# Patient Record
Sex: FEMALE | Race: WHITE | HISPANIC OR LATINO | Employment: UNEMPLOYED | URBAN - METROPOLITAN AREA
[De-identification: names, ages, dates, MRNs, and addresses within clinical notes are randomized per-mention and may not be internally consistent; named-entity substitution may affect disease eponyms.]

---

## 2018-03-29 ENCOUNTER — ANESTHESIA EVENT (OUTPATIENT)
Dept: SURGERY | Facility: HOSPITAL | Age: 43
End: 2018-03-29
Payer: MEDICAID

## 2018-03-29 ENCOUNTER — CLINICAL SUPPORT (OUTPATIENT)
Dept: LAB | Facility: HOSPITAL | Age: 43
End: 2018-03-29
Attending: OBSTETRICS & GYNECOLOGY
Payer: MEDICAID

## 2018-03-29 ENCOUNTER — HOSPITAL ENCOUNTER (OUTPATIENT)
Dept: PREADMISSION TESTING | Facility: HOSPITAL | Age: 43
Discharge: HOME | End: 2018-03-29
Attending: OBSTETRICS & GYNECOLOGY
Payer: MEDICAID

## 2018-03-29 VITALS
WEIGHT: 201.19 LBS | TEMPERATURE: 98 F | HEIGHT: 65 IN | SYSTOLIC BLOOD PRESSURE: 146 MMHG | OXYGEN SATURATION: 98 % | HEART RATE: 79 BPM | BODY MASS INDEX: 33.52 KG/M2 | DIASTOLIC BLOOD PRESSURE: 77 MMHG | RESPIRATION RATE: 16 BRPM

## 2018-03-29 DIAGNOSIS — Z01.818 PRE-OP EXAM: Primary | ICD-10-CM

## 2018-03-29 DIAGNOSIS — Z01.818 PRE-OP EXAM: ICD-10-CM

## 2018-03-29 DIAGNOSIS — E11.9 CONTROLLED TYPE 2 DIABETES MELLITUS WITHOUT COMPLICATION, WITHOUT LONG-TERM CURRENT USE OF INSULIN: ICD-10-CM

## 2018-03-29 DIAGNOSIS — I10 HYPERTENSION, UNSPECIFIED TYPE: ICD-10-CM

## 2018-03-29 PROCEDURE — 93010 ELECTROCARDIOGRAM REPORT: CPT | Mod: ,,, | Performed by: INTERNAL MEDICINE

## 2018-03-29 PROCEDURE — 93005 ELECTROCARDIOGRAM TRACING: CPT

## 2018-03-29 PROCEDURE — 93010 EKG 12-LEAD: ICD-10-PCS | Mod: ,,, | Performed by: INTERNAL MEDICINE

## 2018-03-29 RX ORDER — SODIUM CHLORIDE, SODIUM LACTATE, POTASSIUM CHLORIDE, CALCIUM CHLORIDE 600; 310; 30; 20 MG/100ML; MG/100ML; MG/100ML; MG/100ML
INJECTION, SOLUTION INTRAVENOUS CONTINUOUS
Status: CANCELLED | OUTPATIENT
Start: 2018-03-29

## 2018-03-29 RX ORDER — METFORMIN HYDROCHLORIDE 1000 MG/1
1000 TABLET ORAL 2 TIMES DAILY WITH MEALS
COMMUNITY

## 2018-03-29 RX ORDER — LIDOCAINE HYDROCHLORIDE 10 MG/ML
1 INJECTION, SOLUTION EPIDURAL; INFILTRATION; INTRACAUDAL; PERINEURAL ONCE
Status: CANCELLED | OUTPATIENT
Start: 2018-03-29 | End: 2018-03-29

## 2018-03-29 NOTE — ANESTHESIA PREPROCEDURE EVALUATION
03/29/2018  Lois Varela is a 43 y.o., female is scheduled for laparoscopic oophorectomy under GET[facility] on 4/10/2018.\    Pt [place] speaking only;  present for entire visit.    Past Surgical History:   Procedure Laterality Date    HYSTERECTOMY      VAGINAL DELIVERY      x3        Anesthesia Evaluation    I have reviewed the Patient Summary Reports.    I have reviewed the Nursing Notes.   I have reviewed the Medications.     Review of Systems  Anesthesia Hx:  No problems with previous Anesthesia  History of prior surgery of interest to airway management or planning: Previous anesthesia: General  Denies Personal Hx of Anesthesia complications.   Social:  Non-Smoker, No Alcohol Use    Hematology/Oncology:  Hematology Normal        EENT/Dental:EENT/Dental Normal   Cardiovascular:   Exercise tolerance: good Hypertension (does not recall name of bp medication), well controlled Denies Dysrhythmias.   Denies Angina.        Pulmonary:   Denies Shortness of breath.    Renal/:  Renal/ Normal     Hepatic/GI:  Hepatic/GI Normal    OB/GYN/PEDS:  Pelvic pain   Neurological:  Neurology Normal    Endocrine:   Diabetes, well controlled, type 2        Physical Exam  General:  Obesity    Airway/Jaw/Neck:  Airway Findings: Mouth Opening: Normal Tongue: Normal  General Airway Assessment: Adult  Mallampati: II  TM Distance: Normal, at least 6 cm        Eyes/Ears/Nose:  EYES/EARS/NOSE FINDINGS: Normal   Dental:  Dental Findings: In tact, Periodontal disease, Mild   Chest/Lungs:  Chest/Lungs Clear    Heart/Vascular:  Heart Findings: Normal Heart murmur: negative    Abdomen:  Abdomen Findings: Normal      Mental Status:  Mental Status Findings: Normal        Anesthesia Plan  Type of Anesthesia, risks & benefits discussed:  Anesthesia Type:  general  Patient's Preference:   Intra-op Monitoring Plan: standard ASA  monitors  Intra-op Monitoring Plan Comments:   Post Op Pain Control Plan: multimodal analgesia  Post Op Pain Control Plan Comments:   Induction:   IV  Beta Blocker:  Patient is not currently on a Beta-Blocker (No further documentation required).       Informed Consent: Patient understands risks and agrees with Anesthesia plan.  Questions answered.   ASA Score: 2     Day of Surgery Review of History & Physical:  There are no significant changes.      Anesthesia Plan Notes: Anesthesia consent will be obtained prior to procedure on 4/10/2018.        Ready For Surgery From Anesthesia Perspective.

## 2018-03-29 NOTE — PRE-PROCEDURE INSTRUCTIONS
Aysha  [phone]    Information translated per pt's daughter Aysha  Allergies, medical, surgical, family and psychosocial histories reviewed with patient. Periop plan of care reviewed. Preop instructions given, including medications to take and to hold. Time allotted for questions to be addressed.  Patient verbalized understanding.

## 2018-03-29 NOTE — DISCHARGE INSTRUCTIONS
Your surgery is scheduled for 4/10/18.    Please report to Outpatient Surgery Intake Office on the 2nd FLOOR at 845 a.m.          INSTRUCTIONS IMPORTANT!!!  ¨ Do not eat or drink after 12 midnight-including water. OK to brush teeth, no   gum, candy or mints!    ¨ Take only these medicines with a small swallow of water-morning of surgery.    Blood pressure medicine.        ____  Proceed to [facility] for additional blood test.        ____  Do not wear makeup, including mascara.  ____  No powder, lotions or creams to surgical area.  ____  Please remove all jewelry, including piercings and leave at home.  ____  No money or valuables needed. Please leave at home.  ____  Please bring any documents given by your doctor.  ____  If going home the same day, arrange for a ride home. You will not be able to             drive if Anesthesia was used.  ____  Children under 18 years require a parent / guardian present the entire time             they are in surgery / recovery.  ____  Wear loose fitting clothing. Allow for dressings, bandages.  ____  Stop Aspirin, Ibuprofen, Motrin and Aleve at least 3-5 days before surgery, unless otherwise instructed by your doctor, or the nurse.   You MAY use Tylenol/acetaminophen until day of surgery.  ____  Wash the surgical area with Hibiclens the night before surgery, and again the             morning of surgery.  Be sure to rinse hibiclens off completely (if instructed by   nurse).  ____  If you take diabetic medication, do not take am of surgery unless instructed by Doctor.  ____  Call MD for temperature above 101 degrees.  ____ Stop taking any Fish Oil supplement or any Vitamins that contain Vitamin E at least 5 days prior to surgery.  ____ Do Not wear your contact lenses the day of your procedure.  You may wear your glasses.        I have read or had read and explained to me, and understand the above information.  Additional comments or instructions:  For additional  questions call [phone]      Pre-Op Bathing Instructions    Before surgery, you can play an important role in your own health.    Because skin is not sterile, we need to be sure that your skin is as free of germs as possible. By following the instructions below, you can reduce the number of germs on your skin before surgery.    IMPORTANT: You will need to shower with a special soap called Hibiclens*, available at any pharmacy.  If you are allergic to Chlorhexidine (the antiseptic in Hibiclens), use an antibacterial soap such as Dial Soap for your preoperative shower.  You will shower with Hibiclens both the night before your surgery and the morning of your surgery.  Do not use Hibiclens on the head, face or genitals to avoid injury to those areas.    STEP #1: THE NIGHT BEFORE YOUR SURGERY     1. Do not shave the area of your body where your surgery will be performed.  2. Shower and wash your hair and body as usual with your normal soap and shampoo.  3. Rinse your hair and body thoroughly after you shower to remove all soap residue.  4. With your hand, apply one packet of Hibiclens soap to the surgical site.   5. Wash the site gently for five (5) minutes. Do not scrub your skin too hard.   6. Do not wash with your regular soap after Hibiclens is used.  7. Rinse your body thoroughly.  8. Pat yourself dry with a clean, soft towel.  9. Do not use lotion, cream, or powder.  10. Wear clean clothes.    STEP #2: THE MORNING OF YOUR SURGERY     1. Repeat Step #1.    * Not to be used by people allergic to Chlorhexidine.          Instrucciones Pre-Operativas Para Banarse    Usted juega un papel muy importante en canchola melanie antes de canchola cirugia.  [place] canchola piel no esta esterilizada, necesitamos asegurarnos que se encuentre lo mas limpia y sin germenes que sea posible.  Al seguir estas instrucciones usted podra reducir el jie de germenes en la piel antes de canchola cirugia.    IMPORTANTE: Necesitara banarse con un jabon especial  llamado *Hibiclens, que lo encontrara en cualquier farmacia.  Si usted es alergico a Clorhexidine ( el anticeptico que se encuentra en Hibiclens), use un jabon antibacterial lacey Dial para banarse.  Debera banarse con Hibiclens la noche antes de la cirugia y la manana del maya de canchola cirugia. No use el Hibiclens en canchola katlyn, la samuel o raulito partes genitales para evitar lesiones en esas areas.    La noche antes de canchola cirugia:    1. No afeite la parte de canchola cuerpo donde se tanner la cirugia.  2. Banese y lave canchola maria victoria normalmente con canchola jabon y shampu.  3. Enjuage ginette canchola maria victoria y canchola cuerpo para remover residuos del jabon.  4. Con canchola mano, aplique un paquetito de Hibiclens al area donde se tanner la cirugia.  5. Lave el area suavemente por eric (5) minutos.  No estregue canchola piel muy kandice.  6. No se lave con jabon regular despues de usar el Hibiclens.  7. Enjuage canchola cuerpo muy ginette.  8. Sequese con flynn toalla limpia y suave.  9. No use locion, cremas o polvos/talcos.  10. Use ropa limpia.      La manana del maya de canchola cirujia:           1.Repita el proceso anterior        *No usar si usted es alergico a Chlorhexidine        Anestesia: Anestesia general     Estás vimos continuamente zoila el procedimiento por el proveedor de la anestesia.     Usted tiene programada flynn fecha para flynn operación quirúrgica. Zoila la operación, le administrarán un medicamento anestésico (llamado también anestesia) para que esté cómodo y sin dolor. Canchola cirujano le administrará anestesia general. En esta hoja se da más información sobre cheko tipo de anestesia.  ¿En qué consiste la anestesia general?  Con la anestesia general usted estará profundamente dormido. La anestesia general se administra en la kyra (anestesia intravenosa), en los pulmones (anestesia con gas), o de ambas formas. Usted no sentirá nada zoila la operación, y tampoco la recordará. El anestesista vigila canchola estado y monitoriza canchola frecuencia y ritmo cardíaco, canchola presión  arterial y canchola nivel de oxígeno en la kyra zoila todo el procedimiento.  · Anestesia intravenosa, La anestesia intravenosa se administra mediante flynn sonda intravenosa en el brazo, y suele darse navid para que el paciente ya esté dormido cuando le administren la anestesia con gas. Algunos tipos de anestesia intravenosa alivian el dolor, mientras que otros producen relajación. Canchola médico decidirá qué tipo de anestesia es más adecuado para canchola blane.  · Anestesia con gas. La anestesia con gas se administra en los pulmones por inhalación y suele usarse para mantener al paciente dormido después de recibir anestesia intravenosa. Puede administrarse a través de flynn máscara facial, un tubo endotraqueal o flynn máscara laríngea.  ¨ Si tiene flynn máscara facial, canchola anestesista se la colocará sobre la nariz y la boca, probablemente mientras usted todavía está despierto. Usted inhalará oxígeno mientras le inician la anestesia intravenosa, y luego puede añadirse anestesia a través de la máscara.  ¨ Si se usa un tubo endotraqueal o flynn máscara laríngea, se los colocarán en la garganta flynn vez que se haya dormido.  Medicamentos e instrumentos de anestesia  Probablemente tendrá:  · Anestesia intravenosa administrada directamente en la kyra mediante flynn sonda intravenosa.  · Anestesia con gas administrada en los pulmones, desde los cuales pasa a la kyra.  · Un pulsioxímetro colocado en el extremo de un dedo para medir el nivel de oxígeno en la kyra.  · Electrodos de electrocardiografía para registrar la frecuencia y el ritmo cardíacos.  · Un manguito (esfigmomanómetro) para medir la presión arterial.  Riesgos y posibles complicaciones  La anestesia general tiene ciertos riesgos, entre los cuales se encuentran los siguientes:  · Problemas de la respiración  · Náuseas y vómito  · Garganta irritada o ronquera  · Reacción alérgica a la anestesia  · Persistencia de la insensibilidad en la solitario anestesiada (poco frecuente)  · Ritmo  cardíaco irregular (en casos muy poco frecuentes)  · Paro cardíaco (en casos muy poco frecuentes)   Medidas de seguridad relacionadas con la anestesia  · Siga todas las instrucciones que le hayan dado respecto al tiempo que debe pasar sin comer ni beber antes de la operación.  · Asegúrese de informar a canchola médico de todos los medicamentos que esté tomando, incluyendo también los se adquieren sin receta, las hierbas medicinales y los suplementos alimenticios.  · Póngase de acuerdo con un familiar o amigo adulto para que lo lleve a canchola casa después de la operación.  · Tete las primeras 24 horas después de la operación:  ¨ No maneje automóviles ni maquinaria o herramientas pesadas.  ¨ No tome decisiones importantes ni firme ningún documento.  ¨ Evite el alcohol.  ¨ De ser posible, consiga a alguien que se quede con usted para ayudarlo a mantenerse fuera de peligro en blane de que surja algún problema.  Date Last Reviewed: 10/16/2014  © 7918-2323 [employer]. [address] [place] [address]. Todos los derechos reservados. Esta información no pretende sustituir la atención médica profesional. Sólo canchola médico puede diagnosticar y tratar un problema de melanie.        ¿Qué es chuyita salpingooforectomía bilateral?    Chuyita salpingooforectomía bilateral es un tipo de cirugía. Tete la cirugía, un cirujano le extirpa ambos ovarios y ambas trompas de Falopio de canchola pelvis. Los ovarios están ubicados a cada lado del útero. Son los que producen raulito óvulos. También producen la hormona estrógeno. Las trompas de Falopio conectan los ovarios con el útero. Llevan los óvulos hasta el útero.  Cheko procedimiento hace que usted entre en la menopausia. Después de cheko procedimiento, ya no podrá tener hijos.  ¿Por qué se hace chuyita salpingooforectomía bilateral?  [place] procedimiento se hace si usted tiene cáncer en los ovarios o las trompas de Falopio. O puede que se lo gerry para reducir canchola riesgo de tener cáncer en esas  partes del cuerpo. Puede que canchola riesgo sea alto si tiene flynn familiar que tuvo cáncer de seno o de ovario. También puede tener flynn mutación en los genes BRCA (susceptibilidad al cáncer de seno). Estos son genes que producen proteínas supresoras de tumores. Si estos genes mutan, no trabajan lacey deberían y las células tienen más probabilidades de desarrollar cáncer.  Marcela procedimiento también puede hacerse si le extirparon el útero. [place] se llama histerectomía.  Hay otros problemas de melanie por los que puede ser necesario quitar los ovarios y las trompas de Falopio al mismo tiempo.  ¿Cómo se hace flynn salpingooforectomía bilateral?  Ingresará en un hospital. Puede que deba pasar algunos días allí después de esta cirugía. Tete el procedimiento:  · Le darán medicamentos para que duerma. No sentirá nada de dolor.  · El cirujano le hará un juan carlos (incisión) en canchola abdomen para llegar hasta raulito órganos reproductores.  · El cirujano le quitará los ovarios y las trompas de Falopio.  · Luego, el cirujano cerrará todas las incisiones abiertas y las coserá. Le cerrará la incisión del abdomen.  Riesgos de flynn salpingooforectomía bilateral  · Sangrado  · Infección  · Riesgos relacionados con flynn menopausia temprana, lacey enfermedad cardíaca y pérdida de hueso  Date Last Reviewed: 6/1/2016  © 0055-3005 [employer]. [address] [place] [address], [place] [address]. All rights reserved. This information is not intended as a substitute for professional medical care. Always follow your healthcare professional's instructions.

## 2018-04-10 ENCOUNTER — HOSPITAL ENCOUNTER (OUTPATIENT)
Facility: HOSPITAL | Age: 43
Discharge: HOME | End: 2018-04-10
Attending: OBSTETRICS & GYNECOLOGY | Admitting: OBSTETRICS & GYNECOLOGY
Payer: MEDICAID

## 2018-04-10 ENCOUNTER — SURGERY (OUTPATIENT)
Age: 43
End: 2018-04-10

## 2018-04-10 ENCOUNTER — ANESTHESIA (OUTPATIENT)
Dept: SURGERY | Facility: HOSPITAL | Age: 43
End: 2018-04-10
Payer: MEDICAID

## 2018-04-10 VITALS
HEART RATE: 79 BPM | WEIGHT: 201.25 LBS | DIASTOLIC BLOOD PRESSURE: 80 MMHG | SYSTOLIC BLOOD PRESSURE: 147 MMHG | OXYGEN SATURATION: 95 % | TEMPERATURE: 98 F | RESPIRATION RATE: 16 BRPM | BODY MASS INDEX: 33.53 KG/M2 | HEIGHT: 65 IN

## 2018-04-10 DIAGNOSIS — R10.2 PELVIC PAIN IN FEMALE: ICD-10-CM

## 2018-04-10 LAB
ABO + RH BLD: NORMAL
BLD GP AB SCN CELLS X3 SERPL QL: NORMAL
POCT GLUCOSE: 165 MG/DL (ref 70–110)

## 2018-04-10 PROCEDURE — 71000039 HC RECOVERY, EACH ADD'L HOUR: Performed by: OBSTETRICS & GYNECOLOGY

## 2018-04-10 PROCEDURE — 25000003 PHARM REV CODE 250: Performed by: OBSTETRICS & GYNECOLOGY

## 2018-04-10 PROCEDURE — 25000003 PHARM REV CODE 250: Performed by: NURSE PRACTITIONER

## 2018-04-10 PROCEDURE — 00840 ANES IPER PX LOWER ABD NOS: CPT | Performed by: OBSTETRICS & GYNECOLOGY

## 2018-04-10 PROCEDURE — 71000033 HC RECOVERY, INTIAL HOUR: Performed by: OBSTETRICS & GYNECOLOGY

## 2018-04-10 PROCEDURE — 36000708 HC OR TIME LEV III 1ST 15 MIN: Performed by: OBSTETRICS & GYNECOLOGY

## 2018-04-10 PROCEDURE — 71000016 HC POSTOP RECOV ADDL HR: Performed by: OBSTETRICS & GYNECOLOGY

## 2018-04-10 PROCEDURE — 88305 TISSUE EXAM BY PATHOLOGIST: CPT | Performed by: PATHOLOGY

## 2018-04-10 PROCEDURE — 63600175 PHARM REV CODE 636 W HCPCS: Performed by: NURSE ANESTHETIST, CERTIFIED REGISTERED

## 2018-04-10 PROCEDURE — 36415 COLL VENOUS BLD VENIPUNCTURE: CPT

## 2018-04-10 PROCEDURE — 71000015 HC POSTOP RECOV 1ST HR: Performed by: OBSTETRICS & GYNECOLOGY

## 2018-04-10 PROCEDURE — 27201423 OPTIME MED/SURG SUP & DEVICES STERILE SUPPLY: Performed by: OBSTETRICS & GYNECOLOGY

## 2018-04-10 PROCEDURE — 25000003 PHARM REV CODE 250: Performed by: NURSE ANESTHETIST, CERTIFIED REGISTERED

## 2018-04-10 PROCEDURE — 86850 RBC ANTIBODY SCREEN: CPT

## 2018-04-10 PROCEDURE — 37000008 HC ANESTHESIA 1ST 15 MINUTES: Performed by: OBSTETRICS & GYNECOLOGY

## 2018-04-10 PROCEDURE — 37000009 HC ANESTHESIA EA ADD 15 MINS: Performed by: OBSTETRICS & GYNECOLOGY

## 2018-04-10 PROCEDURE — 88305 TISSUE EXAM BY PATHOLOGIST: CPT | Mod: 26,,, | Performed by: PATHOLOGY

## 2018-04-10 PROCEDURE — 36000709 HC OR TIME LEV III EA ADD 15 MIN: Performed by: OBSTETRICS & GYNECOLOGY

## 2018-04-10 RX ORDER — DIPHENHYDRAMINE HCL 25 MG
25 CAPSULE ORAL EVERY 4 HOURS PRN
Status: DISCONTINUED | OUTPATIENT
Start: 2018-04-10 | End: 2018-04-10 | Stop reason: HOSPADM

## 2018-04-10 RX ORDER — PROPOFOL 10 MG/ML
VIAL (ML) INTRAVENOUS
Status: DISCONTINUED | OUTPATIENT
Start: 2018-04-10 | End: 2018-04-10

## 2018-04-10 RX ORDER — DIPHENHYDRAMINE HYDROCHLORIDE 50 MG/ML
12.5 INJECTION INTRAMUSCULAR; INTRAVENOUS EVERY 6 HOURS PRN
Status: DISCONTINUED | OUTPATIENT
Start: 2018-04-10 | End: 2018-04-10 | Stop reason: HOSPADM

## 2018-04-10 RX ORDER — ONDANSETRON 2 MG/ML
4 INJECTION INTRAMUSCULAR; INTRAVENOUS DAILY PRN
Status: DISCONTINUED | OUTPATIENT
Start: 2018-04-10 | End: 2018-04-10 | Stop reason: HOSPADM

## 2018-04-10 RX ORDER — SODIUM CHLORIDE 0.9 % (FLUSH) 0.9 %
3 SYRINGE (ML) INJECTION
Status: DISCONTINUED | OUTPATIENT
Start: 2018-04-10 | End: 2018-04-10 | Stop reason: HOSPADM

## 2018-04-10 RX ORDER — FENTANYL CITRATE 50 UG/ML
INJECTION, SOLUTION INTRAMUSCULAR; INTRAVENOUS
Status: DISCONTINUED | OUTPATIENT
Start: 2018-04-10 | End: 2018-04-10

## 2018-04-10 RX ORDER — HYDROCODONE BITARTRATE AND ACETAMINOPHEN 5; 325 MG/1; MG/1
1 TABLET ORAL EVERY 4 HOURS PRN
Status: DISCONTINUED | OUTPATIENT
Start: 2018-04-10 | End: 2018-04-10 | Stop reason: HOSPADM

## 2018-04-10 RX ORDER — SUCCINYLCHOLINE CHLORIDE 20 MG/ML
INJECTION INTRAMUSCULAR; INTRAVENOUS
Status: DISCONTINUED | OUTPATIENT
Start: 2018-04-10 | End: 2018-04-10

## 2018-04-10 RX ORDER — ONDANSETRON 8 MG/1
8 TABLET, ORALLY DISINTEGRATING ORAL EVERY 8 HOURS PRN
Status: DISCONTINUED | OUTPATIENT
Start: 2018-04-10 | End: 2018-04-10 | Stop reason: HOSPADM

## 2018-04-10 RX ORDER — ROCURONIUM BROMIDE 10 MG/ML
INJECTION, SOLUTION INTRAVENOUS
Status: DISCONTINUED | OUTPATIENT
Start: 2018-04-10 | End: 2018-04-10

## 2018-04-10 RX ORDER — MIDAZOLAM HYDROCHLORIDE 1 MG/ML
INJECTION, SOLUTION INTRAMUSCULAR; INTRAVENOUS
Status: DISCONTINUED | OUTPATIENT
Start: 2018-04-10 | End: 2018-04-10

## 2018-04-10 RX ORDER — HYDROMORPHONE HYDROCHLORIDE 2 MG/ML
0.5 INJECTION, SOLUTION INTRAMUSCULAR; INTRAVENOUS; SUBCUTANEOUS EVERY 5 MIN PRN
Status: ACTIVE | OUTPATIENT
Start: 2018-04-10 | End: 2018-04-10

## 2018-04-10 RX ORDER — LIDOCAINE HCL/PF 100 MG/5ML
SYRINGE (ML) INTRAVENOUS
Status: DISCONTINUED | OUTPATIENT
Start: 2018-04-10 | End: 2018-04-10

## 2018-04-10 RX ORDER — KETOROLAC TROMETHAMINE 30 MG/ML
INJECTION, SOLUTION INTRAMUSCULAR; INTRAVENOUS
Status: DISCONTINUED | OUTPATIENT
Start: 2018-04-10 | End: 2018-04-10

## 2018-04-10 RX ORDER — ONDANSETRON 2 MG/ML
INJECTION INTRAMUSCULAR; INTRAVENOUS
Status: DISCONTINUED | OUTPATIENT
Start: 2018-04-10 | End: 2018-04-10

## 2018-04-10 RX ORDER — LIDOCAINE HYDROCHLORIDE 10 MG/ML
1 INJECTION, SOLUTION EPIDURAL; INFILTRATION; INTRACAUDAL; PERINEURAL ONCE
Status: DISCONTINUED | OUTPATIENT
Start: 2018-04-10 | End: 2018-04-10 | Stop reason: HOSPADM

## 2018-04-10 RX ORDER — SODIUM CHLORIDE, SODIUM LACTATE, POTASSIUM CHLORIDE, CALCIUM CHLORIDE 600; 310; 30; 20 MG/100ML; MG/100ML; MG/100ML; MG/100ML
INJECTION, SOLUTION INTRAVENOUS CONTINUOUS
Status: DISCONTINUED | OUTPATIENT
Start: 2018-04-10 | End: 2018-04-10 | Stop reason: HOSPADM

## 2018-04-10 RX ORDER — DIPHENHYDRAMINE HYDROCHLORIDE 50 MG/ML
25 INJECTION INTRAMUSCULAR; INTRAVENOUS EVERY 4 HOURS PRN
Status: DISCONTINUED | OUTPATIENT
Start: 2018-04-10 | End: 2018-04-10 | Stop reason: HOSPADM

## 2018-04-10 RX ADMIN — SODIUM CHLORIDE, SODIUM LACTATE, POTASSIUM CHLORIDE, AND CALCIUM CHLORIDE: .6; .31; .03; .02 INJECTION, SOLUTION INTRAVENOUS at 01:04

## 2018-04-10 RX ADMIN — FENTANYL CITRATE 50 MCG: 50 INJECTION, SOLUTION INTRAMUSCULAR; INTRAVENOUS at 01:04

## 2018-04-10 RX ADMIN — KETOROLAC TROMETHAMINE 30 MG: 30 INJECTION, SOLUTION INTRAMUSCULAR; INTRAVENOUS at 01:04

## 2018-04-10 RX ADMIN — PROPOFOL 150 MG: 10 INJECTION, EMULSION INTRAVENOUS at 12:04

## 2018-04-10 RX ADMIN — LIDOCAINE HYDROCHLORIDE 100 MG: 20 INJECTION, SOLUTION INTRAVENOUS at 12:04

## 2018-04-10 RX ADMIN — SUCCINYLCHOLINE CHLORIDE 120 MG: 20 INJECTION, SOLUTION INTRAMUSCULAR; INTRAVENOUS at 12:04

## 2018-04-10 RX ADMIN — FENTANYL CITRATE 100 MCG: 50 INJECTION, SOLUTION INTRAMUSCULAR; INTRAVENOUS at 12:04

## 2018-04-10 RX ADMIN — ROCURONIUM BROMIDE 5 MG: 10 INJECTION, SOLUTION INTRAVENOUS at 12:04

## 2018-04-10 RX ADMIN — HYDROCODONE BITARTRATE AND ACETAMINOPHEN 1 TABLET: 5; 325 TABLET ORAL at 02:04

## 2018-04-10 RX ADMIN — ONDANSETRON 8 MG: 2 INJECTION, SOLUTION INTRAMUSCULAR; INTRAVENOUS at 01:04

## 2018-04-10 RX ADMIN — MIDAZOLAM 2 MG: 1 INJECTION INTRAMUSCULAR; INTRAVENOUS at 11:04

## 2018-04-10 RX ADMIN — SODIUM CHLORIDE, SODIUM LACTATE, POTASSIUM CHLORIDE, AND CALCIUM CHLORIDE: .6; .31; .03; .02 INJECTION, SOLUTION INTRAVENOUS at 11:04

## 2018-04-10 NOTE — ANESTHESIA POSTPROCEDURE EVALUATION
"Anesthesia Post Evaluation    Patient: Lois Varela    Procedure(s) Performed: Procedure(s) (LRB):  OOPHORECTOMY-LAPAROSCOPIC (Bilateral)    Final Anesthesia Type: general  Patient location during evaluation: PACU  Patient participation: Yes- Able to Participate  Level of consciousness: awake and alert, oriented and awake  Post-procedure vital signs: reviewed and stable  Pain management: adequate  Airway patency: patent  PONV status at discharge: No PONV  Anesthetic complications: no      Cardiovascular status: blood pressure returned to baseline  Respiratory status: unassisted and room air  Hydration status: euvolemic  Follow-up not needed.        Visit Vitals  /77   Pulse 74   Temp 36.2 °C (97.2 °F)   Resp 19   Ht 5' 5" (1.651 m)   Wt 91.3 kg (201 lb 4.5 oz)   SpO2 96%   BMI 33.49 kg/m²       Pain/Nathaly Score: Pain Assessment Performed: Yes (4/10/2018  1:50 PM)  Presence of Pain: non-verbal indicators absent (4/10/2018  1:50 PM)  Pain Rating Prior to Med Admin: 4 (4/10/2018  2:50 PM)  Nathaly Score: 9 (4/10/2018  2:05 PM)      "

## 2018-04-10 NOTE — TRANSFER OF CARE
"Anesthesia Transfer of Care Note    Patient: Lois Varela    Procedure(s) Performed: Procedure(s) (LRB):  OOPHORECTOMY-LAPAROSCOPIC (Bilateral)    Patient location: PACU    Anesthesia Type: general    Transport from OR: Transported from OR on 6-10 L/min O2 by face mask with adequate spontaneous ventilation    Post pain: adequate analgesia    Post assessment: no apparent anesthetic complications and tolerated procedure well    Post vital signs: stable    Level of consciousness: awake    Nausea/Vomiting: no nausea/vomiting    Complications: none    Transfer of care protocol was followed      Last vitals:   Visit Vitals  /80   Pulse 62   Temp 36.2 °C (97.2 °F)   Resp 15   Ht 5' 5" (1.651 m)   Wt 91.3 kg (201 lb 4.5 oz)   SpO2 98%   BMI 33.49 kg/m²     "

## 2018-04-10 NOTE — PLAN OF CARE
Pt states she does not want free , she wants daughter to translate. Daughter @ bs. POC board updated and reviewed with pt and pt's family. Pt and pt's family verbalize understanding. Safety precautions maintained. Call bell in reach.  Bed locked and in lowest position. Instructed pt to call for assistance. Pt and daughter verbalize understanding.

## 2018-04-10 NOTE — OP NOTE
DATE OF PROCEDURE:  04/10/2018    PREOPERATIVE DIAGNOSIS:  Pelvic pain.    POSTOPERATIVE DIAGNOSES:  Pelvic pain and pelvic adhesions.    PROCEDURE:  Laparoscopic lysis of adhesions and laparoscopic bilateral   salpingo-oophorectomy.    SURGEON:  Cuong Anna M.D.    ANESTHESIA:  General.    PROCEDURE:  The patient was taken to the Operating Room, prepped and draped in   usual sterile fashion, placed in dorsal lithotomy position.  A 1 cm incision was   made just below the umbilicus and 11-mm trocar was placed at incision under   direct visualization.  Abdomen was insufflated using CO2.  Two 5-mm incisions,   one above the symphysis pubis and one in the left lower quadrant were then   placed and a 5-mm trocars were placed through those incisions under direct   visualization.  Examining the pelvis, there appeared to be extensive adhesions   between the omentum and the adnexa.  It was taken down bluntly and with   assistance of LigaSure device.  The adhesions between the omentum and the   abdominal wall.  These were all taken down with the LigaSure device.  On exam,   the right and left ovaries appeared to be fused together using one mass over the   vaginal cuff.  This was approximately 5 to 6 cm long and approximately 3 cm   wide.  There were adhesions between the omentum and this mass.  The adhesions   were taken down bluntly and sharply.  The ureters were identified bilaterally   and appeared to be well away from the surgical field.  This mass was excised   using the LigaSure and blunt dissection until the whole mass was excised.  The   specimen was then removed through the umbilical incision using EndoCatch.    Pelvis was irrigated with warm normal saline.  Hemostasis was noted throughout.    Rupinder powder was placed into the surgical bed for added hemostasis.  Of note   is that there was extensive adhesiolysis that was performed prior to the   dissection.  At this point, good hemostasis was noted throughout.  The  procedure   was completed.  The CO2 was removed from the abdomen and the umbilical incision   was closed with 2-0 Vicryl suture in interrupted fashion.  The rest of the   incision was closed using Dermabond.  There were no complications.  EBL for the   case was 50 mL.  The patient tolerated procedure well and was extubated in the   Operating Room and taken to Recovery in stable condition.      VG/IN  dd: 04/10/2018 15:45:31 (CDT)  td: 04/10/2018 16:59:42 (CDT)  Doc ID   #[id number]  Job ID #[id number]    CC:

## 2018-04-10 NOTE — BRIEF OP NOTE
[facility]  Brief Operative Note     SUMMARY     Surgery Date: 4/10/2018     Surgeon(s) and Role:     * Cuong Anna MD - Primary    Assisting Surgeon: None    Pre-op Diagnosis:  Pelvic pain in female [R10.2]    Post-op Diagnosis:  Post-Op Diagnosis Codes:     * Pelvic pain in female [R10.2]    Procedure(s) (LRB):  OOPHORECTOMY-LAPAROSCOPIC (Bilateral)    Anesthesia: General    Description of the findings of the procedure: LS BSO    Findings/Key Components: Extensive adhesions    Estimated Blood Loss: * No values recorded between 4/10/2018 12:28 PM and 4/10/2018  1:39 PM *         Specimens:   Specimen (12h ago through future)    Start     Ordered    04/10/18 1331  Specimen to Pathology - Surgery  Once     Comments:  Pre-op Diagnosis: Pelvic pain in female [R10.2]Post-op Diagnosis: Procedure(s):OOPHORECTOMY-LAPAROSCOPIC Number of specimens: Name of specimens: 1. Bilateral ovary      04/10/18 1330          Discharge Note    SUMMARY     Admit Date: 4/10/2018    Discharge Date and Time: No discharge date for patient encounter.    Hospital Course (synopsis of major diagnoses, care, treatment, and services provided during the course of the hospital stay): nl post op     Final Diagnosis: Post-Op Diagnosis Codes:     * Pelvic pain in female [R10.2]    Disposition: Home or Self Care    Follow Up/Patient Instructions:     Medications:  Reconciled Home Medications:      Medication List      CONTINUE taking these medications    INV LISINOPRIL-HCTZ 20-12.5  Take 1 tablet by mouth once daily. For investigational use only.     metFORMIN 1000 MG tablet  Commonly known as:  GLUCOPHAGE  Take 1,000 mg by mouth 2 (two) times daily with meals.            Discharge Procedure Orders  Diet general     Activity as tolerated       Follow-up Information     Cunog Anna MD In 4 weeks.    Specialty:  Obstetrics  Contact information:  [address] [place] [address] [phone]

## 2018-04-10 NOTE — DISCHARGE INSTRUCTIONS
DIET: You may resume your home diet. If nausea is present, increase your diet gradually with fluids and bland foods    ACTIVITY LEVEL: You have received sedation or an anesthetic, you may feel sleepy for several hours. Rest until you are more awake. Gradually resume your normal activities    Medications: Pain medication should be taken only if needed and as directed. If antibiotics are prescribed, the medication should be taken until completed. You will be given an updated list of you medications.    No driving, alcoholic beverages or signing legal documents for next 24 hours or while taking pain medication.       CALL THE DOCTOR:    For any obvious bleeding (some dried blood over the incision is normal).      Redness, swelling, foul smell around incision or fever over 101.   Shortness of breath, Coughing up Bloody sputum, Pains or Swelling in your Calves .   Persistent pain or nausea not relieved by medication.    If any unusual problems or difficulties occur contact your doctor. If you cannot contact your doctor but feel your signs and symptoms warrant a physicians attention return to the emergency room.      Discharge Instructions for Oophorectomy  You had a procedure called oophorectomy. This is the surgical removal of one or more ovaries. These walnut-sized organs in your pelvic area make and release the eggs. The eggs can grow to become a baby when combined with a mans sperm. Ovaries also make hormones that regulate your menstrual cycle (also called your period). Your periods may stop if both ovaries were removed if you were still menstruating before the surgery. You may have other symptoms of menopause as well, such as hot flashes. A hysterectomy to remove the uterus is often done with oophorectomy.   Activity  · Rest when you are tired.  · Take your medicine exactly as instructed by your doctor.  · Continue the coughing and deep breathing exercises that you learned in the hospital.  · Listen to your  body. If an activity causes pain, stop.  · Limit your activity for 4 to 6 weeks.  · Dont lift anything heavier than 10 pounds.  · Don't do strenuous activities, such as mowing the lawn, vacuuming, or playing sports.  · Limit your activity to regular short walks. Gradually increase your pace and distance as you feel able.  · Dont drive for until you are comfortable without taking narcotics. This may take up to 2 weeks. You may ride in a car for short trips.  Other home care  · Dont put anything in your vagina until your doctor says its OK.  ¨ Dont douche.  ¨ Dont use tampons.  ¨ Dont have sex.  · Shower as needed.  ¨ Wash your incision gently with mild soap and warm water.  ¨ Keep your incision clean and dry.  · Check your temperature each day for 1 week after your surgery.  · Eat a healthy, well-balanced diet.  · Avoid constipation.  ¨ Use laxatives, stool softeners, or enemas as directed by your doctor.  ¨ Eat more high-fiber foods.  ¨ Drink 6 to 8 glasses of water every day, unless directed otherwise.  Follow-up care  Make a follow-up appointment as directed by our staff.     When to call your doctor  Call your doctor right away if you have any of the following:  · Fever above 100.4°F (38°C) or chills  · Bright red bleeding or foul smelling discharge from your vagina  · Trouble urinating, or burning during urination  · Severe abdominal pain or bloating  · Redness, swelling, or draining at your incision site  · Shortness of breath or chest pain  · Nausea and vomiting  · Increasing pain with or without activity   Date Last Reviewed: 5/19/2015  © 7036-9496 [employer]. [address] [place] [address], [place] [address]. All rights reserved. This information is not intended as a substitute for professional medical care. Always follow your healthcare professional's instructions.      Discharge Instructions: After Your Surgery  Youve just had surgery. During surgery, you were given medicine called  anesthesia to keep you relaxed and free of pain. After surgery, you may have some pain or nausea. This is common. Here are some tips for feeling better and getting well after surgery.     Stay on schedule with your medicine.   Going home  Your healthcare provider will show you how to take care of yourself when you go home. He or she will also answer your questions. Have an adult family member or friend drive you home. For the first 24 hours after your surgery:  · Do not drive or use heavy equipment.  · Do not make important decisions or sign legal papers.  · Do not drink alcohol.  · Have someone stay with you, if needed. He or she can watch for problems and help keep you safe.  Be sure to go to all follow-up visits with your healthcare provider. And rest after your surgery for as long as your healthcare provider tells you to.  Coping with pain  If you have pain after surgery, pain medicine will help you feel better. Take it as told, before pain becomes severe. Also, ask your healthcare provider or pharmacist about other ways to control pain. This might be with heat, ice, or relaxation. And follow any other instructions your surgeon or nurse gives you.  Tips for taking pain medicine  To get the best relief possible, remember these points:  · Pain medicines can upset your stomach. Taking them with a little food may help.  · Most pain relievers taken by mouth need at least 20 to 30 minutes to start to work.  · Taking medicine on a schedule can help you remember to take it. Try to time your medicine so that you can take it before starting an activity. This might be before you get dressed, go for a walk, or sit down for dinner.  · Constipation is a common side effect of pain medicines. Call your healthcare provider before taking any medicines such as laxatives or stool softeners to help ease constipation. Also ask if you should skip any foods. Drinking lots of fluids and eating foods such as fruits and vegetables that  are high in fiber can also help. Remember, do not take laxatives unless your surgeon has prescribed them.  · Drinking alcohol and taking pain medicine can cause dizziness and slow your breathing. It can even be deadly. Do not drink alcohol while taking pain medicine.  · Pain medicine can make you react more slowly to things. Do not drive or run machinery while taking pain medicine.  Your healthcare provider may tell you to take acetaminophen to help ease your pain. Ask him or her how much you are supposed to take each day. Acetaminophen or other pain relievers may interact with your prescription medicines or other over-the-counter (OTC) medicines. Some prescription medicines have acetaminophen and other ingredients. Using both prescription and OTC acetaminophen for pain can cause you to overdose. Read the labels on your OTC medicines with care. This will help you to clearly know the list of ingredients, how much to take, and any warnings. It may also help you not take too much acetaminophen. If you have questions or do not understand the information, ask your pharmacist or healthcare provider to explain it to you before you take the OTC medicine.  Managing nausea  Some people have an upset stomach after surgery. This is often because of anesthesia, pain, or pain medicine, or the stress of surgery. These tips will help you handle nausea and eat healthy foods as you get better. If you were on a special food plan before surgery, ask your healthcare provider if you should follow it while you get better. These tips may help:  · Do not push yourself to eat. Your body will tell you when to eat and how much.  · Start off with clear liquids and soup. They are easier to digest.  · Next try semi-solid foods, such as mashed potatoes, applesauce, and gelatin, as you feel ready.  · Slowly move to solid foods. Dont eat fatty, rich, or spicy foods at first.  · Do not force yourself to have 3 large meals a day. Instead eat smaller  amounts more often.  · Take pain medicines with a small amount of solid food, such as crackers or toast, to avoid nausea.     Call your surgeon if  · You still have pain an hour after taking medicine. The medicine may not be strong enough.  · You feel too sleepy, dizzy, or groggy. The medicine may be too strong.  · You have side effects like nausea, vomiting, or skin changes, such as rash, itching, or hives.       If you have obstructive sleep apnea  You were given anesthesia medicine during surgery to keep you comfortable and free of pain. After surgery, you may have more apnea spells because of this medicine and other medicines you were given. The spells may last longer than usual.   At home:  · Keep using the continuous positive airway pressure (CPAP) device when you sleep. Unless your healthcare provider tells you not to, use it when you sleep, day or night. CPAP is a common device used to treat obstructive sleep apnea.  · Talk with your provider before taking any pain medicine, muscle relaxants, or sedatives. Your provider will tell you about the possible dangers of taking these medicines.  Date Last Reviewed: 12/1/2016  © 9648-8228 [email or web address]. [address] [place] [address], [place] [address]. All rights reserved. This information is not intended as a substitute for professional medical care. Always follow your healthcare professional's instructions.

## 2018-04-10 NOTE — PLAN OF CARE
Patient aaox3. Vss. Denies pain at this time. Patient urinated. Patients daughters at bedside and both speak and understanding english. Patient okay for her daughters to translate for her. Written prescription provided to the patients daugther. Written and verbal  instructions provided to the patient per her daughters, verbalized understanding. Patient discharged via wheelchair to the car.

## 2021-10-29 ENCOUNTER — OFFICE VISIT (OUTPATIENT)
Dept: PODIATRY | Facility: CLINIC | Age: 46
End: 2021-10-29
Payer: MEDICAID

## 2021-10-29 VITALS
SYSTOLIC BLOOD PRESSURE: 126 MMHG | BODY MASS INDEX: 32.32 KG/M2 | OXYGEN SATURATION: 98 % | HEART RATE: 88 BPM | DIASTOLIC BLOOD PRESSURE: 78 MMHG | HEIGHT: 65 IN | WEIGHT: 194 LBS

## 2021-10-29 DIAGNOSIS — E11.49 TYPE II DIABETES MELLITUS WITH NEUROLOGICAL MANIFESTATIONS: Primary | ICD-10-CM

## 2021-10-29 PROCEDURE — 99203 PR OFFICE/OUTPT VISIT, NEW, LEVL III, 30-44 MIN: ICD-10-PCS | Mod: S$PBB,,, | Performed by: PODIATRIST

## 2021-10-29 PROCEDURE — 99999 PR PBB SHADOW E&M-NEW PATIENT-LVL III: CPT | Mod: PBBFAC,,, | Performed by: PODIATRIST

## 2021-10-29 PROCEDURE — 99999 PR PBB SHADOW E&M-NEW PATIENT-LVL III: ICD-10-PCS | Mod: PBBFAC,,, | Performed by: PODIATRIST

## 2021-10-29 PROCEDURE — 99203 OFFICE O/P NEW LOW 30 MIN: CPT | Mod: S$PBB,,, | Performed by: PODIATRIST

## 2021-10-29 PROCEDURE — 99203 OFFICE O/P NEW LOW 30 MIN: CPT | Mod: PBBFAC,PN | Performed by: PODIATRIST

## 2021-10-29 RX ORDER — LEVOTHYROXINE SODIUM 125 UG/1
125 TABLET ORAL EVERY MORNING
COMMUNITY
Start: 2021-10-06

## 2024-11-22 ENCOUNTER — OFFICE VISIT (OUTPATIENT)
Facility: CLINIC | Age: 49
End: 2024-11-22
Payer: MEDICAID

## 2024-11-22 VITALS
HEIGHT: 65 IN | WEIGHT: 183.06 LBS | SYSTOLIC BLOOD PRESSURE: 122 MMHG | DIASTOLIC BLOOD PRESSURE: 70 MMHG | BODY MASS INDEX: 30.5 KG/M2 | OXYGEN SATURATION: 98 % | RESPIRATION RATE: 18 BRPM | TEMPERATURE: 98 F | HEART RATE: 78 BPM

## 2024-11-22 DIAGNOSIS — N76.0 ACUTE VAGINITIS: ICD-10-CM

## 2024-11-22 DIAGNOSIS — Z11.59 NEED FOR HEPATITIS C SCREENING TEST: ICD-10-CM

## 2024-11-22 DIAGNOSIS — Z12.12 ENCOUNTER FOR COLORECTAL CANCER SCREENING: ICD-10-CM

## 2024-11-22 DIAGNOSIS — Z12.11 ENCOUNTER FOR COLORECTAL CANCER SCREENING: ICD-10-CM

## 2024-11-22 DIAGNOSIS — E89.0 POSTOPERATIVE HYPOTHYROIDISM: ICD-10-CM

## 2024-11-22 DIAGNOSIS — Z11.4 ENCOUNTER FOR SCREENING FOR HUMAN IMMUNODEFICIENCY VIRUS (HIV): ICD-10-CM

## 2024-11-22 DIAGNOSIS — E78.49 OTHER HYPERLIPIDEMIA: ICD-10-CM

## 2024-11-22 DIAGNOSIS — Z00.00 ENCOUNTER FOR ANNUAL PHYSICAL EXAM: ICD-10-CM

## 2024-11-22 DIAGNOSIS — E11.42 TYPE 2 DIABETES MELLITUS WITH DIABETIC POLYNEUROPATHY, WITHOUT LONG-TERM CURRENT USE OF INSULIN: Primary | ICD-10-CM

## 2024-11-22 DIAGNOSIS — I10 PRIMARY HYPERTENSION: ICD-10-CM

## 2024-11-22 PROBLEM — E03.9 HYPOTHYROIDISM: Status: ACTIVE | Noted: 2024-02-26

## 2024-11-22 PROBLEM — E11.9 DM (DIABETES MELLITUS), TYPE 2: Status: ACTIVE | Noted: 2024-11-22

## 2024-11-22 PROBLEM — G62.9 NEUROPATHY: Status: ACTIVE | Noted: 2024-02-26

## 2024-11-22 PROBLEM — E66.9 OBESITY WITH BODY MASS INDEX 30 OR GREATER: Status: ACTIVE | Noted: 2024-11-22

## 2024-11-22 PROBLEM — H10.13 ALLERGIC CONJUNCTIVITIS OF BOTH EYES: Status: ACTIVE | Noted: 2020-04-30

## 2024-11-22 PROCEDURE — 99204 OFFICE O/P NEW MOD 45 MIN: CPT | Mod: S$PBB,,,

## 2024-11-22 PROCEDURE — 3078F DIAST BP <80 MM HG: CPT | Mod: CPTII,,,

## 2024-11-22 PROCEDURE — 99215 OFFICE O/P EST HI 40 MIN: CPT | Mod: PBBFAC,PN

## 2024-11-22 PROCEDURE — 99999 PR PBB SHADOW E&M-EST. PATIENT-LVL V: CPT | Mod: PBBFAC,,,

## 2024-11-22 PROCEDURE — 4010F ACE/ARB THERAPY RXD/TAKEN: CPT | Mod: CPTII,,,

## 2024-11-22 PROCEDURE — 3008F BODY MASS INDEX DOCD: CPT | Mod: CPTII,,,

## 2024-11-22 PROCEDURE — 3046F HEMOGLOBIN A1C LEVEL >9.0%: CPT | Mod: CPTII,,,

## 2024-11-22 PROCEDURE — 3074F SYST BP LT 130 MM HG: CPT | Mod: CPTII,,,

## 2024-11-22 RX ORDER — PROPRANOLOL HYDROCHLORIDE 20 MG/1
TABLET ORAL
COMMUNITY
Start: 2024-02-22

## 2024-11-22 RX ORDER — GLIMEPIRIDE 4 MG/1
4 TABLET ORAL
COMMUNITY
Start: 2024-02-22

## 2024-11-22 RX ORDER — OLMESARTAN MEDOXOMIL 20 MG/1
20 TABLET ORAL DAILY
Qty: 30 TABLET | Refills: 1 | Status: SHIPPED | OUTPATIENT
Start: 2024-11-22 | End: 2025-01-21

## 2024-11-22 RX ORDER — ATORVASTATIN CALCIUM 20 MG/1
1 TABLET, FILM COATED ORAL DAILY
COMMUNITY

## 2024-11-22 RX ORDER — INDOMETHACIN 25 MG/1
1 CAPSULE ORAL
COMMUNITY

## 2024-11-22 RX ORDER — EMPAGLIFLOZIN 25 MG/1
1 TABLET, FILM COATED ORAL DAILY
COMMUNITY

## 2024-11-22 RX ORDER — INSULIN PUMP SYRINGE, 3 ML
EACH MISCELLANEOUS
COMMUNITY

## 2024-11-22 RX ORDER — IBUPROFEN 800 MG/1
800 TABLET ORAL 3 TIMES DAILY
COMMUNITY

## 2024-11-22 RX ORDER — LIDOCAINE 50 MG/G
1 PATCH TOPICAL
COMMUNITY

## 2024-11-22 RX ORDER — FLUCONAZOLE 150 MG/1
TABLET ORAL
Qty: 2 TABLET | Refills: 0 | Status: SHIPPED | OUTPATIENT
Start: 2024-11-22

## 2024-11-22 RX ORDER — LISINOPRIL AND HYDROCHLOROTHIAZIDE 12.5; 2 MG/1; MG/1
1 TABLET ORAL DAILY
COMMUNITY
Start: 2024-02-22 | End: 2024-11-22

## 2024-11-22 NOTE — PROGRESS NOTES
SUBJECTIVE     Chief Complaint   Patient presents with    [facility]     Pt wants to est care with a new provider and has no pain       HPI  Lois Varela is a 49 y.o. female with multiple medical diagnoses as listed in the medical history and problem list that presents for annual exam. Pt is new to me. She has a good appetite and eats well. She does exercise. She sleeps for ~8 hours nightly. Pt does not take OTC supplements. She does not have any current stressors. Pt is UTD on age appropriate CA screening. Dental exam is UTD. Eye exam is UTD.     HPI    History of Present Illness    CHIEF COMPLAINT:  Patient presents today for diabetes management and medication review.    DIABETES MANAGEMENT:  She reports fluctuating blood sugar ranging from 136 to 300, expressing concern about these uncontrolled levels which she has never experienced before. She experiences anxiety related to eating due to these sugar fluctuations. Her diet includes high-sugar foods such as plantains and rice. For diabetes management, she is taking Glimepiride 4 mg and Metformin.    CURRENT MEDICATIONS:  She is currently taking Lisinopril with a diuretic, Atorvastatin 20 mg for cholesterol, and Claritin for allergies. She has a few pills left of Acyclovir prescribed for herpes. She occasionally takes Indomethacin for pain. She reports taking vitamins inconsistently, not on a daily basis as recommended.     SYMPTOMS:  She experiences persistent itching and rash in the vaginal area, possibly due to recurrent yeast infection. She also notes occasional numbness in her hands and feet, particularly in a small area of her hand. She denies experiencing sensations of ants crawling on her hands or feet. She states that symptoms sometimes resolve on their own, but then recur after a few days.    SLEEP AND EXERCISE:  She exercises sometimes and walks. She reports sleeping about eight hours per night but states she feels she is not sleeping  well.    SURGICAL HISTORY:  She reports a history of thyroid surgery with gland removal.     PREVENTIVE CARE:  She had a mammogram approximately four months ago, although she is uncertain whether it was performed at [facility] or [facility] She denies having had a colonoscopy. She reports normal bowel movements without any issues and denies experiencing vaginal bleeding or blood in the stool.    Patient denies any shortness of breath, dizziness, headaches, N/V, vision changes, chest pains, or palpitations at present time.      ROS:  General: -fever, -chills, -fatigue, -weight gain, -weight loss  Eyes: -vision changes, -redness, -discharge  ENT: -ear pain, -nasal congestion, -sore throat  Cardiovascular: -chest pain, -palpitations, -lower extremity edema  Respiratory: -cough, -shortness of breath  Gastrointestinal: -abdominal pain, -nausea, -vomiting, -diarrhea, -constipation, -blood in stool, -bright red blood per rectum  Genitourinary: -dysuria, -hematuria, -frequency  Musculoskeletal: -joint pain, -muscle pain  Skin: +rash, -lesion  Neurological: -headache, -dizziness, -numbness, -tingling  Psychiatric: +anxiety, -depression, +sleep difficulty         PAST MEDICAL HISTORY:  Past Medical History:   Diagnosis Date    Hypertension     Hypothyroidism 2/26/2024    Neuropathy 2/26/2024    Type 2 diabetes mellitus, controlled        PAST SURGICAL HISTORY:  Past Surgical History:   Procedure Laterality Date    HYSTERECTOMY      VAGINAL DELIVERY      x3       SOCIAL HISTORY:  Social History     Socioeconomic History    Marital status: Single   Tobacco Use    Smoking status: Never   Substance and Sexual Activity    Alcohol use: No    Drug use: No    Sexual activity: Yes     Partners: Male     Social Drivers of Health     Financial Resource Strain: Low Risk  (10/27/2024)    Received from [facility]    Overall Financial Resource Strain (CARDIA)     Difficulty of Paying Living Expenses: Not very hard   Food Insecurity: No Food  Insecurity (10/27/2024)    Received from [facility]    Hunger Vital Sign     Worried About Running Out of Food in the Last Year: Never true     Ran Out of Food in the Last Year: Never true   Transportation Needs: No Transportation Needs (10/27/2024)    Received from [facility]    PRAPARE - Transportation     Lack of Transportation (Medical): No     Lack of Transportation (Non-Medical): No   Physical Activity: Insufficiently Active (10/27/2024)    Received from [facility]    Exercise Vital Sign     Days of Exercise per Week: 1 day     Minutes of Exercise per Session: 30 min   Stress: No Stress Concern Present (10/27/2024)    Received from [facility]    [employer] - Occupational Stress Questionnaire     Feeling of Stress : Not at all   Housing Stability: Unknown (10/27/2024)    Received from [facility]    Housing Stability Vital Sign     Unable to Pay for Housing in the Last Year: No       FAMILY HISTORY:  No family history on file.    ALLERGIES AND MEDICATIONS: updated and reviewed.  Review of patient's allergies indicates:  No Known Allergies  Current Outpatient Medications   Medication Sig Dispense Refill    glimepiride (AMARYL) 4 MG tablet Take 4 mg by mouth daily with breakfast.      INV LISINOPRIL-HCTZ 20-12.5 Take 1 tablet by mouth once daily. For investigational use only.      levothyroxine (SYNTHROID) 125 MCG tablet Take 125 mcg by mouth every morning.      metFORMIN (GLUCOPHAGE) 1000 MG tablet Take 1,000 mg by mouth 2 (two) times daily with meals.      propranoloL (INDERAL) 20 MG tablet Take 1 tablet twice a day by oral route.      atorvastatin (LIPITOR) 20 MG tablet Take 1 tablet by mouth once daily.      blood sugar diagnostic (RELION PRIME TEST STRIPS) Strp 1 strip by Misc.(Non-Drug; Combo Route) route 3 (three) times daily. 100 strip 3    blood-glucose meter (TRUE METRIX GLUCOSE METER) kit USE AS DIRECTED      fluconazole (DIFLUCAN) 150 MG Tab Take 1 tab (150 mg) by  "mouth once daily x 1 day. May take 1 tab by mouth after 72 hrs from first dose. 2 tablet 0    ibuprofen (ADVIL,MOTRIN) 800 MG tablet Take 800 mg by mouth 3 (three) times daily.      indomethacin (INDOCIN) 25 MG capsule Take 1 capsule by mouth 3 (three) times daily with meals.      JARDIANCE 25 mg tablet Take 1 tablet by mouth once daily.      LIDOcaine (LIDODERM) 5 % Place 1 patch onto the skin.      olmesartan (BENICAR) 20 MG tablet Take 1 tablet (20 mg total) by mouth once daily. Para la presion 30 tablet 1     No current facility-administered medications for this visit.       OBJECTIVE     Physical Exam  Vitals:    11/22/24 1306   BP: 122/70   Pulse: 78   Resp: 18   Temp: 98 °F (36.7 °C)    Body mass index is 30.47 kg/m².  Weight: 83 kg (183 lb 1.5 oz)   Height: 5' 5" (165.1 cm)     Physical Exam  Vitals reviewed.   Constitutional:       General: She is not in acute distress.  HENT:      Right Ear: External ear normal.      Left Ear: External ear normal.      Nose: Nose normal.      Mouth/Throat:      Mouth: Mucous membranes are moist.   Eyes:      Extraocular Movements: Extraocular movements intact.      Conjunctiva/sclera: Conjunctivae normal.      Pupils: Pupils are equal, round, and reactive to light.   Cardiovascular:      Rate and Rhythm: Normal rate and regular rhythm.      Pulses: Normal pulses.      Heart sounds: Normal heart sounds.   Pulmonary:      Effort: Pulmonary effort is normal.      Breath sounds: Normal breath sounds.   Abdominal:      General: Bowel sounds are normal. There is no distension.      Palpations: Abdomen is soft.   Musculoskeletal:         General: No swelling. Normal range of motion.      Cervical back: Normal range of motion.   Skin:     General: Skin is warm and dry.      Findings: No rash.   Neurological:      General: No focal deficit present.      Mental Status: She is alert and oriented to person, place, and time.   Psychiatric:         Mood and Affect: Mood normal.         " Behavior: Behavior normal.         Thought Content: Thought content normal.         Judgment: Judgment normal.         Health Maintenance         Date Due Completion Date    Hepatitis C Screening Never done ---    HIV Screening Never done ---    Mammogram 06/19/2018 6/19/2017    Colorectal Cancer Screening Never done ---    Shingles Vaccine (1 of 2) Never done ---    Influenza Vaccine (1) 09/01/2024 10/11/2017    COVID-19 Vaccine (3 - 2024-25 season) 11/22/2025 (Originally 9/1/2024) 12/13/2021    TETANUS VACCINE 03/27/2027 3/27/2017    Lipid Panel 11/25/2029 11/25/2024    RSV Vaccine (Age 60+ and Pregnant patients) (1 - 1-dose 75+ series) 01/30/2049 ---              ASSESSMENT     49 y.o. female with     1. Type 2 diabetes mellitus with diabetic polyneuropathy, without long-term current use of insulin    2. Primary hypertension    3. Encounter for annual physical exam    4. Postoperative hypothyroidism    5. Encounter for colorectal cancer screening    6. Need for hepatitis C screening test    7. Encounter for screening for human immunodeficiency virus (HIV)    8. Other hyperlipidemia    9. Acute vaginitis        PLAN:     1. Type 2 diabetes mellitus with diabetic polyneuropathy, without long-term current use of insulin  Patient is encouraged to follow a diet low in carbohydrates and simple sugars.  Discussed simple vs. complex carbohydrates. Advised to focus on good food choices and increased physical activity and encouraged to adhere to medication regimen and/or lifestyle adjustments, and to check glucose level as recommended.  Contact office if glucose levels are not improving over time.  Will monitor HbA1c appropriately.    - Hemoglobin A1C; Future  - blood sugar diagnostic (RELION PRIME TEST STRIPS) Strp; 1 strip by Misc.(Non-Drug; Combo Route) route 3 (three) times daily.  Dispense: 100 strip; Refill: 3    2. Primary hypertension   Advised to maintain a low Na diet(<2g/day), exercise, and keep BP log to  present to next visit. Discontinued lisinopril and started on olmesartan.   - Comprehensive Metabolic Panel; Future  - CBC Auto Differential; Future  - olmesartan (BENICAR) 20 MG tablet; Take 1 tablet (20 mg total) by mouth once daily. Para la presion  Dispense: 30 tablet; Refill: 1    3. Encounter for annual physical exam  - Discussed age and gender appropriate screenings at this visit and encouraged a healthy diet low in simple carbohydrates, and increased physical activity.  Counseled on medically appropriate vaccines based on age and current health condition.  Screening test reviewed and discussed with patient.      4. Postoperative hypothyroidism  Ordered labs to check levels. Will treat accordingly once labs are reviewed.    - TSH; Future  - T3, FREE; Future  - T4, FREE; Future    5. Encounter for colorectal cancer screening  Due. Ordered.   - Ambulatory referral/consult to Endo Procedure ; Future    6. Need for hepatitis C screening test  Due. Ordered.   - Hepatitis C Antibody; Future    7. Encounter for screening for human immunodeficiency virus (HIV)  Due. Ordered.   - HIV 1/2 Ag/Ab (4th Gen); Future    8. Other hyperlipidemia  Ordered labs to check levels. Will treat accordingly once labs are reviewed.    - Lipid Panel; Future    9. Acute vaginitis  Recurrent.   Vaginitis prevention including:    a. Avoiding feminine products such as deodorant soaps, body wash, bubble bath, douches, scented toilet paper, deodorant tampons or pads, baby or feminine wipes, chronic pad use, etc.   b. Avoiding other vulvovaginal irritants such as long hot baths, humidity, tight, synthetic clothing, chlorine and sitting around in wet bathing suits.    c. Wear cotton underwear, avoiding thong underwear and wear no underwear at bedtime.     d. Taking showers instead of baths and use a hair dryer on cool setting afterwards to dry off.    e. Wear cotton underwear to exercise and shower immediately after exercise and change  clothes.   f. Using polyurethane condoms without spermicide if sexually active and symptoms are triggered by intercourse.    Diflucan and Mycolog cream use and potential side effects.    -pelvic rest until symptoms resolve.    - fluconazole (DIFLUCAN) 150 MG Tab; Take 1 tab (150 mg) by mouth once daily x 1 day. May take 1 tab by mouth after 72 hrs from first dose.  Dispense: 2 tablet; Refill: 0      Assessment & Plan    Assessed diabetes management, considering current medication regimen and glucose levels  Evaluated blood pressure control and considered adjusting antihypertensive therapy  Reviewed thyroid medication needs post-thyroidectomy  Considered potential changes to cholesterol management  Assessed for vaginal yeast infection, likely due to poorly controlled diabetes  Evaluated need for additional diagnostic tests to assess overall health status and diabetes complications    TYPE 2 DIABETES MELLITUS:  - Explained the relationship between poorly controlled blood sugar and increased hunger/anxiety about eating.  - Discussed dietary choices for diabetes management, highlighting foods elevated in sugar content.  - Educated on the importance of using natural coconut water instead of sweetened versions for diabetics.  - Patient to check blood sugar in the morning while fasting.  - Patient to limit intake of elevated-sugar foods (green plantains, cassava, tortillas, rice, pasta, mirta, pineapple, kiwi, melon).  - Recommend reducing frequency of eating green bananas, ripe bananas, yams, and potatoes.  - Hemoglobin A1c ordered to assess diabetes management.    HYPERTENSION:  - Started olmesartan for blood pressure control.  - Discontinued current blood pressure medication after finishing the current supply.    HYPOTHYROIDISM:  - Continued thyroid medication.  - Thyroid function tests ordered to assess overall health status.    HYPERCHOLESTEROLEMIA:  - Continued Atorvastatin 20mg for cholesterol management.  - Lipid  panel ordered to assess overall health status.    VAGINAL YEAST INFECTION:  - Explained the connection between diabetes and increased risk of vaginal yeast infections.  - Started Fluconazole: Take first pill today, second pill in 3 days for vaginal yeast infection.    DIETARY COUNSELING:  - Recommend using olive oil, coconut oil, or avocado oil for cooking.  - Discussed the importance of daily vitamin intake.    CANCER SCREENING:  - Informed about the colonoscopy procedure and its purpose in cancer screening.  - Referred for colonoscopy.    GENERAL HEALTH ASSESSMENT:  - CBC, CMP ordered to assess overall health status.  - Hepatitis screening ordered.    FOLLOW-UP:  - Follow up in 4 weeks.        Sierra Carroll, MSN, APRN, FNP-C  [facility]  11/22/2024 1:12 PM    I spent a total of 45 minutes on the day of the visit.This includes face to face time and non-face to face time preparing to see the patient (eg, review of tests), obtaining and/or reviewing separately obtained history, documenting clinical information in the electronic or other health record, independently interpreting results and communicating results to the patient/family/caregiver, or care coordinator.     This note was generated with the assistance of ambient listening technology. Verbal consent was obtained by the patient and accompanying visitor(s) for the recording of patient appointment to facilitate this note. I attest to having reviewed and edited the generated note for accuracy, though some syntax or spelling errors may persist. Please contact the author of this note for any clarification.        Follow up in about 4 weeks (around 12/20/2024).

## 2024-11-25 ENCOUNTER — LAB VISIT (OUTPATIENT)
Dept: LAB | Facility: HOSPITAL | Age: 49
End: 2024-11-25
Payer: MEDICAID

## 2024-11-25 DIAGNOSIS — E11.9 TYPE 2 DIABETES MELLITUS WITHOUT COMPLICATION, WITHOUT LONG-TERM CURRENT USE OF INSULIN: ICD-10-CM

## 2024-11-25 DIAGNOSIS — E78.49 OTHER HYPERLIPIDEMIA: ICD-10-CM

## 2024-11-25 DIAGNOSIS — Z11.59 NEED FOR HEPATITIS C SCREENING TEST: ICD-10-CM

## 2024-11-25 DIAGNOSIS — Z11.4 ENCOUNTER FOR SCREENING FOR HUMAN IMMUNODEFICIENCY VIRUS (HIV): ICD-10-CM

## 2024-11-25 DIAGNOSIS — E89.0 POSTOPERATIVE HYPOTHYROIDISM: ICD-10-CM

## 2024-11-25 DIAGNOSIS — I10 PRIMARY HYPERTENSION: ICD-10-CM

## 2024-11-25 LAB
ALBUMIN SERPL BCP-MCNC: 4 G/DL (ref 3.5–5.2)
ALP SERPL-CCNC: 76 U/L (ref 40–150)
ALT SERPL W/O P-5'-P-CCNC: 16 U/L (ref 10–44)
ANION GAP SERPL CALC-SCNC: 11 MMOL/L (ref 8–16)
AST SERPL-CCNC: 15 U/L (ref 10–40)
BASOPHILS # BLD AUTO: 0.04 K/UL (ref 0–0.2)
BASOPHILS NFR BLD: 0.6 % (ref 0–1.9)
BILIRUB SERPL-MCNC: 0.6 MG/DL (ref 0.1–1)
BUN SERPL-MCNC: 12 MG/DL (ref 6–20)
CALCIUM SERPL-MCNC: 9.3 MG/DL (ref 8.7–10.5)
CHLORIDE SERPL-SCNC: 101 MMOL/L (ref 95–110)
CHOLEST SERPL-MCNC: 205 MG/DL (ref 120–199)
CHOLEST/HDLC SERPL: 4.8 {RATIO} (ref 2–5)
CO2 SERPL-SCNC: 26 MMOL/L (ref 23–29)
CREAT SERPL-MCNC: 0.7 MG/DL (ref 0.5–1.4)
DIFFERENTIAL METHOD BLD: ABNORMAL
EOSINOPHIL # BLD AUTO: 0.1 K/UL (ref 0–0.5)
EOSINOPHIL NFR BLD: 0.8 % (ref 0–8)
ERYTHROCYTE [DISTWIDTH] IN BLOOD BY AUTOMATED COUNT: 13.5 % (ref 11.5–14.5)
EST. GFR  (NO RACE VARIABLE): >60 ML/MIN/1.73 M^2
GLUCOSE SERPL-MCNC: 145 MG/DL (ref 70–110)
HCT VFR BLD AUTO: 37.4 % (ref 37–48.5)
HDLC SERPL-MCNC: 43 MG/DL (ref 40–75)
HDLC SERPL: 21 % (ref 20–50)
HGB BLD-MCNC: 12.9 G/DL (ref 12–16)
IMM GRANULOCYTES # BLD AUTO: 0.01 K/UL (ref 0–0.04)
IMM GRANULOCYTES NFR BLD AUTO: 0.1 % (ref 0–0.5)
LDLC SERPL CALC-MCNC: 136.6 MG/DL (ref 63–159)
LYMPHOCYTES # BLD AUTO: 3.6 K/UL (ref 1–4.8)
LYMPHOCYTES NFR BLD: 49.4 % (ref 18–48)
MCH RBC QN AUTO: 27.3 PG (ref 27–31)
MCHC RBC AUTO-ENTMCNC: 34.5 G/DL (ref 32–36)
MCV RBC AUTO: 79 FL (ref 82–98)
MONOCYTES # BLD AUTO: 0.4 K/UL (ref 0.3–1)
MONOCYTES NFR BLD: 5.4 % (ref 4–15)
NEUTROPHILS # BLD AUTO: 3.1 K/UL (ref 1.8–7.7)
NEUTROPHILS NFR BLD: 43.7 % (ref 38–73)
NONHDLC SERPL-MCNC: 162 MG/DL
NRBC BLD-RTO: 0 /100 WBC
PLATELET # BLD AUTO: 255 K/UL (ref 150–450)
PMV BLD AUTO: 11 FL (ref 9.2–12.9)
POTASSIUM SERPL-SCNC: 3.7 MMOL/L (ref 3.5–5.1)
PROT SERPL-MCNC: 7 G/DL (ref 6–8.4)
RBC # BLD AUTO: 4.72 M/UL (ref 4–5.4)
SODIUM SERPL-SCNC: 138 MMOL/L (ref 136–145)
T4 FREE SERPL-MCNC: 1.17 NG/DL (ref 0.71–1.51)
TRIGL SERPL-MCNC: 127 MG/DL (ref 30–150)
TSH SERPL DL<=0.005 MIU/L-ACNC: 3.35 UIU/ML (ref 0.4–4)
WBC # BLD AUTO: 7.18 K/UL (ref 3.9–12.7)

## 2024-11-25 PROCEDURE — 80061 LIPID PANEL: CPT

## 2024-11-25 PROCEDURE — 84443 ASSAY THYROID STIM HORMONE: CPT

## 2024-11-25 PROCEDURE — 36415 COLL VENOUS BLD VENIPUNCTURE: CPT

## 2024-11-25 PROCEDURE — 84439 ASSAY OF FREE THYROXINE: CPT

## 2024-11-25 PROCEDURE — 83036 HEMOGLOBIN GLYCOSYLATED A1C: CPT

## 2024-11-25 PROCEDURE — 86803 HEPATITIS C AB TEST: CPT

## 2024-11-25 PROCEDURE — 85025 COMPLETE CBC W/AUTO DIFF WBC: CPT

## 2024-11-25 PROCEDURE — 84481 FREE ASSAY (FT-3): CPT

## 2024-11-25 PROCEDURE — 87389 HIV-1 AG W/HIV-1&-2 AB AG IA: CPT

## 2024-11-25 PROCEDURE — 80053 COMPREHEN METABOLIC PANEL: CPT

## 2024-11-26 LAB
ESTIMATED AVG GLUCOSE: 286 MG/DL (ref 68–131)
HBA1C MFR BLD: 11.6 % (ref 4–5.6)
HCV AB SERPL QL IA: NORMAL
HIV 1+2 AB+HIV1 P24 AG SERPL QL IA: NORMAL
T3FREE SERPL-MCNC: 2.5 PG/ML (ref 2.3–4.2)

## 2024-11-27 ENCOUNTER — TELEPHONE (OUTPATIENT)
Dept: ENDOSCOPY | Facility: HOSPITAL | Age: 49
End: 2024-11-27
Payer: MEDICAID

## 2024-11-27 NOTE — TELEPHONE ENCOUNTER
Luisana Schofield Shekeita H, RN  Caller: Unspecified (2 days ago,  1:55 PM)         Previous Messages       ----- Message -----  From: Lorna Carrasco  Sent: 11/25/2024   1:57 PM CST  To: [facility] Endoscopy Schedulers  Subject: Appointment Access                              Please call pt. @[phone]  to schedule an appt.    Referral in chart (Needs ).    DEDRICK Carrasco

## 2024-11-27 NOTE — TELEPHONE ENCOUNTER
Contacted the patient to schedule an endoscopy procedure(s) colonoscopy using language line,  ID# [id number]. The patient did not answer the call. Unable to leave voicemail message.

## 2024-11-29 ENCOUNTER — TELEPHONE (OUTPATIENT)
Dept: ENDOSCOPY | Facility: HOSPITAL | Age: 49
End: 2024-11-29
Payer: MEDICAID

## 2024-11-29 NOTE — TELEPHONE ENCOUNTER
Contacted the patient to schedule an endoscopy procedure(s) colonoscopy using language line,  [employer] ID# [id number].  Unable to leave voicemail message.

## 2024-11-29 NOTE — TELEPHONE ENCOUNTER
Luisana Schofield Shekeita H, RN  Caller: Unspecified (4 days ago,  1:55 PM)         Previous Messages       ----- Message -----  From: Loran Carrasco  Sent: 11/25/2024   1:57 PM CST  To: [facility] Endoscopy Schedulers  Subject: Appointment Access                              Please call pt. @[phone]  to schedule an appt.    Referral in chart (Needs ).    DEDRICK Carrasco

## 2024-12-03 ENCOUNTER — TELEPHONE (OUTPATIENT)
Dept: ENDOSCOPY | Facility: HOSPITAL | Age: 49
End: 2024-12-03
Payer: MEDICAID

## 2024-12-03 DIAGNOSIS — Z12.11 SPECIAL SCREENING FOR MALIGNANT NEOPLASMS, COLON: Primary | ICD-10-CM

## 2024-12-03 DIAGNOSIS — Z12.12 ENCOUNTER FOR COLORECTAL CANCER SCREENING: ICD-10-CM

## 2024-12-03 DIAGNOSIS — Z12.11 ENCOUNTER FOR COLORECTAL CANCER SCREENING: ICD-10-CM

## 2024-12-03 NOTE — TELEPHONE ENCOUNTER
Instrucciones de preparación para colonoscopia    Fecha del procedimiento: 1/13/25 Presentarse a las: 11:30 AM    Ubicación del Departamento:   [facility] [address] [place] [address]  Take the Elevators to 2nd Floor Endoscopy Procedural Area    Lo antes posible:  Retire la preparación de la farmacia y el LAXANTE DULCOLAX EN COMPRIMIDOS de venta segundo           Un día antes del procedimiento...   Está PERMITIDO:   Leola ÚNICAMENTE líquidos transparentes: juan f el listado más abajo.     Líquidos permitidos:   Agua.  Bebidas isotónicas (Gatorade, Power-Aid).  Café o té (no agregar crema ni sustitutos no lácteos).  Jugos marquita sin pulpa (manzana, uva sun).  Gelatina (sin fruta ni agregados).  Refrescos marquita (Sprite, cola, refrescos de jengibre).  Caldo de joanie (hasta la medianoche anterior al procedimiento).    Está PROHIBIDO:   Ingerir alimentos sólidos, leola leche o ingerir  líquidos rojos.  Leola alcohol.  Leola medicamentos por vía oral zoila la hora previa a comenzar cada dosis de la preparación.  Mascar chicles o comer golosinas zoila la mañana del procedimiento.                       Aclaración:   (No siga las indicaciones por defecto de la farmacia).  La preparación de limpieza intestinal PEG se indica para realizar flynn limpieza del colon, lacey preparación para realizar flynn colonoscopia en pacientes adultos.   Informe a canchola médico acerca de todos los medicamentos que ciara, incluyendo medicamentos recetados y de venta segundo, vitaminas y suplementos naturales. La preparación de limpieza intestinal PEG puede afectar la efectividad de otros medicamentos.  Los medicamentos por vía oral pueden no absorberse adecuadamente si se administran zoila la hora previa a comenzar cada dosis de la preparación de limpieza intestinal PEG.    Al leola la preparación, puede que experimente cólicos, náuseas y/o vómitos. Si siente náuseas o tiene vómitos mientras mario, deje de hacerlo  por 20 o 30 minutos, y luego continúe.      Cómo noemy la preparación:    La preparación de limpieza intestinal PEG es flynn preparación que se realiza en 2 días.    Se necesita flynn (1) botella de solución para completar la preparación para la colonoscopia. Diluya la solución concentrada antes de usarla, según las indicaciones. Debe noemy agua con todas las dosis, y luego de cada flynn de las dosis debe noemy un poco más de agua.    DOSIS 1: Un día antes de la colonoscopia 1/12/25     Allyn al menos 6 a 8 vasos de líquidos transparentes desde el momento en que se levanta hasta que comienza la preparación, y luego continúe hasta la hora de irse a dormir, para evitar deshidratarse.     12:00 p. m. (MEDIODÍA) Mezcle el recipiente completo de preparación con agua tibia y refrigere. [place]land cuatro (4) comprimidos de Dulcolax (bisacodilo) con al menos 8 onzas de líquidos transparentes.      6:00 p. m.:    Debe completar los pasos 1 y 2 a continuación, antes de irse a dormir:    Paso 1: Allyn la mitad del líquido del recipiente en el transcurso de flynn (1) hora.   Paso 2: Refrigere la mitad restante de la preparación para la segunda dosis. Juan F a continuación cuándo comenzar cheko paso.                       IMPORTANTE: Si experimenta síntomas relacionados con la preparación (por ejemplo, náuseas, distensión abdominal o cólicos), interrumpa o reduzca el ritmo de ciara de agua adicional, hasta que los síntomas disminuyan.    DOSIS 2: El día de la colonoscopia 1/13/25 de 2 a 3 a. m.    Para esta dosis, repita el paso 1 detallado arriba y utilice la mitad restante de la preparación líquida.   Puede continuar bebiendo líquidos transparentes / agua hasta   2 horas antes de la colonoscopia o según las indicaciones de la enfermera a cargo de programar la humble  10:30 AM.      Para recibir más información sobre cheko procedimiento, hay dos (2) cosas para darrian antes de la colonoscopia:  Juan F cheko video informativo. Es importante que juan f cheko video  animado de consentimiento antes de asistir a la humble. Si no lo ha visto antes de asistir, es obligatorio que lo juan f en el momento de la admisión, lo cual puede ocasionar demoras.    Opciones para darrian:   Con un teclado: mantener la tecla control (Ctrl) presionada y hacer clic al mismo tiempo para ingresar a los enlaces.           Video instructivo para colonoscopia                                                                                   O    Escriba la dirección del enlace en la aman de direcciones del navegador web:  [email or web address]/[email or web address]      Folleto informativo con imágenes:      Preparación para colonoscopia - Líquido      Comentarios:                   IMPORTANT INFORMATION TO KNOW BEFORE YOUR PROCEDURE    [facility] 2nd Floor         If your procedure requires the administration of anesthesia, it is necessary for a responsible adult to drive you home. (Medical Transportation, Uber, Lyft, Taxi, etc. may ONLY be used if a responsible adult is present to accompany you home.  The responsible adult CAN'T be the  of the service).      person must be available to return to pick you up within 15 minutes of being notified of discharge.       Please bring a picture ID, insurance card, & copayment      Take Medications as directed below:      If you are taking the oral medication(s):   Invokana (Canagliflozin), Farxiga (Dapagliflozin), Jardiance (Empagliflozin), Invokamet/Invokamet XR (invokana +metformin), Xigduo (farxiga + metformin), Synjardy XR (jardiance + metformin), hold 3 days prior to your procedure, please stop taking on 1/10/25.       If you begin taking any blood thinning medications, injectable weight loss/diabetes medications (other than insulin) , or Adipex (Phentermine) please contact the endoscopy scheduling department listed below as soon as possible.    If you are diabetic see the attached instruction sheet regarding your medication.      If you take HEART, BLOOD PRESSURE, SEIZURE, PAIN, LUNG (including inhalers/nebulizers), ANTI-REJECTION (transplant patients), or PSYCHIATRIC medications, please take at your regular times with a sip of water or as directed by the scheduling nurse.     Important contact information:    Endoscopy [phone] Hours of operation Monday-Friday 8:00-4:30pm.    Questions about insurance or financial obligations call [phone] or [phone].    If you have questions regarding the prep or need to reschedule, please call [phone]. After hours questions requiring immediate assistance, contact [facility] On[facility]Call nurse line at [phone] or [phone].   NOTE:     On occasion, unforeseen circumstances may cause a delay in your procedure start time. We respect your time and appreciate your patience during these circumstances.      Comments:         Si tiene dudas sobre estas instrucciones, llame al [facility] al [phone].       Medicamentos de administración oral El día de la preparación El día del procedimiento    Glyburide  NO lo tome la mañana del procedimiento. Si lo ciara    Glucotrol (glipizida) NO lo tome. dos veces al día, tómelo con la ayo.    Amaryl (glimepirida)            Glupophage  NO lo tome la mañana del procedimiento. Tómelo    Glumetza Tómelo cuando reanude las comidas.    Fortamet (metformina) según lo indicado.           Januvia (sitagliptina)  NO lo tome la mañana del procedimiento. Tómelo    Nesina (alogliptina)  cuando reanude las comidas.    Onglyza (saxagliptina) Tómelo     Tradjenta (linagliptina) según lo indicado.           Invokana (canagliflozina)      Farxiga (dapagliflozina) Suspenda por 2 días NO lo tome la mañana del procedimiento. Tómelo    Jardiance (empagliflozina) antes de la preparación. cuando reanude las comidas.          Actos (pioglitazona) Tómelo NO lo tome la mañana del procedimiento. Tómelo     según lo indicado. cuando  reanude las comidas.          Medicamentos inyectables Dosis diarias Dosis semanales    o combinados      Adlyxin (lixisenatida) Si ciara estos  Si la dosis es semanal, suspéndala al menos 8 andi    Byetta (exenatida) medicamentos todos  antes de la humble. Se puede administrar la dosis luego del    Bydureon (exenatida XL) los días, el día de canchola procedimiento.    Ozempic (semaglutida) humble suspenda     Trulicity (dulaglutida) la dosis hasta     Victoza (liraglutida) después de canchola     Mounjaro (tirzepatida) procedimiento.     Soliqua      Xultophy      Es importante controlar el azúcar en kyra mientras hace la preparación intestinal. El día de la preparación, mientras realiza la dieta de líquidos transparentes, puede consumir bebidas con azúcar lacey treasure de glucosa. Asegúrese de mezclar la preparación únicamente con agua o bebidas sin azúcar. A continuación, se encuentran las instrucciones   para ajustar la medicación para la diabetes antes del procedimiento programado. Si tiene preguntas, llame al   prestador de servicios médicos responsable de canchola tratamiento para la diabetes.        Insulina para diabetes        El día de la preparación El día del   mellitus tipo 1                                                                                                                                   procedimiento   Basaglar  Lantus Si la administra por la mañana, hágalo según lo indicado. Si la administra por la noche, administre el 70 % de la dosis. Si la administra por la mañana, administre el 80 %.   Levemir  Toujeo  Si la administra por las noches, administre la dosis   Tresiba  habitual.   Semglee            NO la administre la mañana del procedimiento. Administre cuando reanude las comidas.        Bomba de insulina Controle los carbohidratos y, en función de ello,  ajuste la dosis. Se puede utilizar la dosis basal al 70 % luego de comenzar la preparación con la dieta de líquidos transparentes sin azúcar y hasta la  medianoche. Se puede utilizar la dosis basal al 70 % desde la medianoche y hasta después del procedimiento.        Insulina para diabetes  mellitus tipo 2 El día de la preparación El día del procedimiento   Basaglar  Lantus  Levemir  Toujeo  Tresiba Si la administra por la mañana, hágalo según lo indicado. Si la administra por la noche, administre el 70 % de la dosis. Si la administra por la mañana, administre el 80 %. Si la administra por las noches, administre la dosis habitual.        Afrezza  Apidra Aplicar el 50 % de la dosis con la dieta de líquidos transparentes. NO la administre la mañana del procedimiento. Administre cuando reanude las comidas.   Fiasp NO utilizar después de comenzar    Humalog 100  Humalog 200  Novolog la preparación con dieta de líquidos transparentes sin azúcar. Si usa flynn escala de corrección, administre la dosis cada 4 horas, según sea necesario.         NPH Aplique el 50 % de las dosis del desayuno y NO la administre la mañana del    de la ayo, con dieta de líquidos transparentes. procedimiento. Administre la dosis habitual con la ayo          Insulina corriente Aplique el 50 % de la dosis del desayuno y NO aplique la dosis de la ayo, después de comenzar la preparación con dieta de líquidos sin azúcar. Si NO la administre la mañana del procedimiento. Administre la dosis habitual con la ayo.    usa flynn escala de corrección, administre la dosis cada 6 horas, según sea necesario.            Novolog Mix 70/30 Humolog Mix 75/25  Humolog Mix 50/50 Aplique el 50 % de la dosis del desayuno. Aplique el 25 % de la dosis de la ayo. NO administre la dosis del desayuno. Administre la dosis habitual con la ayo.          U500 Administre el 50 % de la dosis unitaria matutina o del desayuno.  Administre el 25 % de la dosis unitaria del almuerzo o la ayo. NO la administre la mañana del procedimiento. Adminístrela cuando reanude las comidas.            V-Go Continúe utilizando canchola dispositivo  V-Go. NO activar después de comenzar la preparación con dieta de líquidos transparentes sin azúcar. Continúe utilizando canchola  dispositivo V-Go. Retome las aplicaciones con las comidas.        Edición 3.4.24

## 2024-12-03 NOTE — TELEPHONE ENCOUNTER
----- Message from Luisana sent at 11/25/2024  2:30 PM CST -----  Regarding: FW: Appointment Access    ----- Message -----  From: Lorna Carrasco  Sent: 11/25/2024   1:57 PM CST  To: [facility] Endoscopy Schedulers  Subject: Appointment Access                               Please call pt. [phone]  to schedule an appt.    Referral in chart (Needs ).    DEDRICK Carrasco

## 2024-12-03 NOTE — TELEPHONE ENCOUNTER
Colonoscopy Procedure Prep Instructions    Date of procedure: 1/13/25 Arrive at: 11:30 AM    Location of Department:   [facility] [address], [place] [address]  Take the Elevators to 2nd Floor Endoscopy Procedural Area    As soon as possible:   your prep from pharmacy and over the counter DULCOLAX LAXATIVE TABLETS            On the day before your procedure   What You CAN do:   You may have clear liquids ONLY-see below for list.     Liquids That Are OK to Drink:   Water  Sports drinks (Gatorade, Power-Aid)  Coffee or tea (no cream or nondairy creamer)  Clear juices without pulp (apple, white grape)  Gelatin desserts (no fruit or toppings)  Clear soda (sprite, coke, ginger ale)  Chicken broth (until 12 midnight the night before procedure)    What You CANNOT do:   Do not EAT solid food, drink milk or anything   colored red.  Do not drink alcohol.  Do not take oral medications within 1 hour of starting   each dose of prep.  No gum chewing or candy morning of procedure                       Note:   (Please disregard the insert instructions from pharmacy).  PEG Bowel Prep is indicated for cleansing of the colon as a preparation for colonoscopy in adults.   Be sure to tell your doctor about all the medicines you take, including prescription and non-prescription medicines, vitamins, and herbal supplements. PEG Bowel Prep may affect how other medicines work.  Medication taken by mouth may not be absorbed properly when taken within 1 hour before the start of each dose of PEG Bowel Prep.    It is not uncommon to experience some abdominal cramping, nausea and/or vomiting when taking the prep. If you have nausea and/or vomiting while taking the prep, stop drinking for 20 to 30 minutes then continue.      How to take prep:    PEG Bowel Prep is a (2-day) prep.    One (1) bottle of prep are required for a complete preparation for colonoscopy. Dilute the solution concentrate as directed  prior to use. You must drink water with each dose of prep, and additional water after each dose.    DOSE 1--Day Before Colonoscopy 1/12/25     Drink at least 6 to 8 glasses of clear liquids from time you wake up until you begin your prep and then continue until bedtime to avoid dehydration.     12:00 pm (NOON) Mix your entire container of prep with lukewarm water and refrigerate. Take four (4) Dulcolax (Bisacodyl) tablets with at least 8 ounces or more of clear liquids.       6:00 pm:    You must complete Steps 1 and 2 below before going to bed:    Step 1-Drink half the liquid in the container within one (1) hour.   Step 2-Refrigerate the remaining half of the liquid for dose 2. See below when to begin this step.                       IMPORTANT: If you experience preparation-related symptoms (for example, nausea, bloating, or cramping), stop, or slow the rate of drinking the additional water until your symptoms decrease.    DOSE 2--Day of the Colonoscopy 1/13/25 at 2-3 AM.    For this dose, repeat Step 1 shown above using the remaining half of the liquid prep.   You may continue drinking water/clear liquids until   2 hours before your colonoscopy or as directed by the scheduling nurse  10:30 AM.      For information about your procedure, two (2) things to view prior to colonoscopy:  Please watch this informational video. It is important to watch this animated consent video prior to your arrival. If you haven't watched the video prior to arriving, you are required to watch it during admission which can causes delays.    Options for viewing:   Using a keyboard:  press and hold the control tab (Ctrl) and left mouse click to follow links.           Colonoscopy Instructional Video                                                                                   OR    Type link address into your web browser's address bar:  https://[email or web address].com/[email or web address]      Educational Booklet with  pictures:      Colonoscopy Prep - Liquid      Comments:                 IMPORTANT INFORMATION TO KNOW BEFORE YOUR PROCEDURE    [facility] 2nd Floor         If your procedure requires the administration of anesthesia, it is necessary for a responsible adult to drive you home. (Medical Transportation, Uber, Lyft, Taxi, etc. may ONLY be used if a responsible adult is present to accompany you home.  The responsible adult CAN'T be the  of the service).      person must be available to return to pick you up within 15 minutes of being notified of discharge.       Please bring a picture ID, insurance card, & copayment      Take Medications as directed below:      If you are taking the oral medication(s):   Invokana (Canagliflozin), Farxiga (Dapagliflozin), Jardiance (Empagliflozin), Invokamet/Invokamet XR (invokana +metformin), Xigduo (farxiga + metformin), Synjardy XR (jardiance + metformin), hold 3 days prior to your procedure, please stop taking on 1/10/25.       If you begin taking any blood thinning medications, injectable weight loss/diabetes medications (other than insulin) , or Adipex (Phentermine) please contact the endoscopy scheduling department listed below as soon as possible.    If you are diabetic see the attached instruction sheet regarding your medication.     If you take HEART, BLOOD PRESSURE, SEIZURE, PAIN, LUNG (including inhalers/nebulizers), ANTI-REJECTION (transplant patients), or PSYCHIATRIC medications, please take at your regular times with a sip of water or as directed by the scheduling nurse.     Important contact information:    Endoscopy [phone] Hours of operation Monday-Friday 8:00-4:30pm.    Questions about insurance or financial obligations call [phone] or [phone].    If you have questions regarding the prep or need to reschedule, please call [phone]. After hours questions requiring immediate assistance, contact [facility]  On-Call nurse line at [phone] or [phone].   NOTE:     On occasion, unforeseen circumstances may cause a delay in your procedure start time. We respect your time and appreciate your patience during these circumstances.      Comments:         If you are unsure about any of these instructions, call [facility] at [phone].                          Oral Medicine  Day of Prep  Day of Procedure           Glyburide    Do NOT take morning of procedure. If you         Glucotrol (Glipizide)  Do NOT take. take twice daily, take with dinner.         Amaryl (Glimepiride)                                Glucophage    Do NOT take morning of procedure. Take         Glumetza  Take as  when you start eating again.          Fortamet (Metformin)  prescribed.                              Januvia (Sitaglipitin)    Do NOT take morning of procedure. Take         Nesina (Aloglipitin)    when you start eating again.          Onglyza (Saxaglipitin)  Take as              Tradjenta (Linaglipitin)  prescribed.                              Invokana (Canagliflozin)               Farxiga (Dapagliflozin)  Stop 2 days  Do NOT take morning of procedure. Take         Jardiance(Empagliflozin) before prep.  when you start eating again.                          Actos (Pioglitazone)  Take as  Do NOT take morning of procedure. Take           prescribed.  when you start eating again.                          Injectable & Combination Daily Dose  Weekly Dose           Medicine                Adlyxin (Lixisenatide)  If you take these For weekly dose, hold dose at least 8 days prior        Byetta (Exenatide)  medications daily to appointment. You may take the dose after your        Bydureon (Exenatide XL) on the day of your procedure.            Ozempic (Semaglutide)  appointment hold              Trulicity (Dulaglutide)  that dose until              Victoza (Liraglutide)  after your              Mounjaro (Tirzepatide)  procedure.               Prosper Medina                It is important to monitor your blood sugar while doing the bowel preparation. On the day of your bowel   prep, when you are on a clear liquid diet, you may drink beverages with sugar as your source of glucose.   Be sure to mix the prep with water or sugar free liquid only. Below are instructions on how to adjust your   diabetic medications prior to your scheduled procedure. Call the healthcare provider who manages your   diabetes if you have questions.   Insulin for Type 1   Diabetes Mellitus Day of Prep                                         Day of procedure          Basaglar If you use in the morning, take as prescribed.  If you take in the morning,         Lantus If you use in the evening, inject 70% of dose. inject 80% of dose. If you take        Levemir      in the evenings, inject usual         Toujeo      dose.           Shant Kim Count carbs and adjust dose   Do NOT take morning of procedure.         Apidra accordingly. If not carb counting,  Take when you start eating again.         Humalog 100 take 25% of usual meal dose.             Humalog 200 May use correction dose every              Novolog 4 hours. Do NOT use once sugar-free             liquid prep is started.                             Insulin Pump Count carbs and adjust your   May use temp basal rate at 70 % starting          dose as needed. May use temp basal at midnight until after procedure.          rate at 70 % after sugar-free clear liquid             prep is started until midnight.                              Insulin for Type 2   Diabetes Mellitus Day of Prep                                         Day of procedure          Basaglar If you use in the morning, take as prescribed.  If you take in the morning,         Lantus If you use in the evening, inject 70% of dose. inject 80% of dose. If you take        Levemir      in the  evenings, inject usual         Toujeo      dose.           Tresiba                                                Afrezza Inject 50 % of dose with clear liquid diet.   Do NOT take morning of         Apidra Do NOT use once sugar-free clear liquid prep procedure. Take when you         Fiasp is started. If you are using a correction scale,  start eating meals again.         Humalog 100 take dose every 4 hours as needed.             Humalog 200                Novolog                                NPH  Inject 50% of breakfast and dinner   Do NOT take morning of         doses with clear liquid diet.    procedure. Take usual dose              when you eat dinner.                         Regular  Inject 50% of breakfast dose and do NOT take Do NOT take morning of          dinner dose once sugar-free liquid prep has   procedure. Take usual dose          started. If using correction scale, make take dose when you eat dinner.          every 6 hours as needed.                              Novolog Mix 70/30 Inject 50% of breakfast dose. Inject 25%  Do NOT take breakfast dose.         Humolog Mix 75/25 of dinner dose.     Take usual dose when you eat         Humolog Mix 50/50      dinner.                           U500 Take 50% of AM or breakfast unit esteban dose.  Do NOT take mornining of           Take 25% of lunch or dinner or PM unit esteban dose.  procedure. Take when you               start eating again.                          V-Go  Continue to wear your V-Go device. Do NOT click  Coninue to wear your V-Go         once sugar-free clear liquid prep is started.   device. Resume clicks with               meals.

## 2024-12-03 NOTE — TELEPHONE ENCOUNTER
Referral for procedure from  [facility] referral (see Appts tab)      Spoke to patient to schedule procedure(s) Colonoscopy using language line,  ID# [id number]       Physician to perform procedure(s) Dr. GABRIEL Simon  Date of Procedure (s) 1/13/25  Arrival Time 11:30 AM  Time of Procedure(s) 12:30 PM   Location of Procedure(s) [place] 2nd Floor  Type of Rx Prep sent to patient: PEG  Instructions provided to patient via Postal Mail    Patient was informed on the following information and verbalized understanding. Screening questionnaire reviewed with patient and complete. If procedure requires anesthesia, a responsible adult needs to be present to accompany the patient home, patient cannot drive after receiving anesthesia. Appointment details are tentative, especially check-in time. Patient will receive a prep-op call 7 days prior to confirm check-in time for procedure. If applicable the patient should contact their pharmacy to verify Rx for procedure prep is ready for pick-up. Patient was advised to call the scheduling department at [phone] if pharmacy states no Rx is available. Patient was advised to call the endoscopy scheduling department if any questions or concerns arise.      [facility] Scheduling Department

## 2025-01-06 ENCOUNTER — TELEPHONE (OUTPATIENT)
Dept: ENDOSCOPY | Facility: HOSPITAL | Age: 50
End: 2025-01-06
Payer: MEDICAID

## 2025-01-06 NOTE — TELEPHONE ENCOUNTER
Contacted patient for pre-call and patient requested to reschedule.  [facility]  [facility] (see Appts tab)    Spoke to Lois Varela to schedule Colonoscopy       Physician to perform procedure(s) Dr. GABRIEL Simon  Date of Procedure (s) 2/17/25  Arrival Time 11:45 AM  Time of Procedure(s) 12:45 PM   Location of Procedure(s) [place] 2nd Floor  Type of Rx Prep sent to patient's pharmacy: PEG  Instructions provided to patient via [facility]  Patient denies use of blood thinners, GLP-1 medications, and weight loss medications.  The following information was discussed with patient, and patient verbalized understanding:  Screening questionnaire reviewed with patient and complete. If procedure requires anesthesia, a responsible adult needs to be present to accompany the patient home. Appointment details are tentative, especially check-in time. Patient will receive a pre-op call 7 days prior to appointment to confirm check-in time for procedure. If applicable the patient should contact their pharmacy to verify Rx for procedure prep is ready for pick-up. Patient was instructed to call the scheduling department at [phone] if pharmacy states no Rx is available. Patient was also advised to call the endoscopy scheduling department if any questions or concerns arise.       Endoscopy Scheduling Department

## 2025-01-08 ENCOUNTER — LAB VISIT (OUTPATIENT)
Dept: LAB | Facility: HOSPITAL | Age: 50
End: 2025-01-08
Payer: MEDICAID

## 2025-01-08 ENCOUNTER — OFFICE VISIT (OUTPATIENT)
Facility: CLINIC | Age: 50
End: 2025-01-08
Payer: MEDICAID

## 2025-01-08 VITALS
WEIGHT: 182.13 LBS | OXYGEN SATURATION: 100 % | SYSTOLIC BLOOD PRESSURE: 128 MMHG | TEMPERATURE: 98 F | DIASTOLIC BLOOD PRESSURE: 80 MMHG | BODY MASS INDEX: 31.09 KG/M2 | RESPIRATION RATE: 18 BRPM | HEART RATE: 76 BPM | HEIGHT: 64 IN

## 2025-01-08 DIAGNOSIS — E11.42 TYPE 2 DIABETES MELLITUS WITH DIABETIC POLYNEUROPATHY, WITHOUT LONG-TERM CURRENT USE OF INSULIN: ICD-10-CM

## 2025-01-08 DIAGNOSIS — B37.31 VAGINAL YEAST INFECTION: ICD-10-CM

## 2025-01-08 DIAGNOSIS — E03.8 OTHER SPECIFIED HYPOTHYROIDISM: ICD-10-CM

## 2025-01-08 DIAGNOSIS — I10 PRIMARY HYPERTENSION: Primary | ICD-10-CM

## 2025-01-08 PROCEDURE — 3074F SYST BP LT 130 MM HG: CPT | Mod: CPTII,,,

## 2025-01-08 PROCEDURE — 99214 OFFICE O/P EST MOD 30 MIN: CPT | Mod: PBBFAC,PN

## 2025-01-08 PROCEDURE — 99214 OFFICE O/P EST MOD 30 MIN: CPT | Mod: S$PBB,,,

## 2025-01-08 PROCEDURE — 1159F MED LIST DOCD IN RCRD: CPT | Mod: CPTII,,,

## 2025-01-08 PROCEDURE — 3079F DIAST BP 80-89 MM HG: CPT | Mod: CPTII,,,

## 2025-01-08 PROCEDURE — 3061F NEG MICROALBUMINURIA REV: CPT | Mod: CPTII,,,

## 2025-01-08 PROCEDURE — 3008F BODY MASS INDEX DOCD: CPT | Mod: CPTII,,,

## 2025-01-08 PROCEDURE — 4010F ACE/ARB THERAPY RXD/TAKEN: CPT | Mod: CPTII,,,

## 2025-01-08 PROCEDURE — 99999 PR PBB SHADOW E&M-EST. PATIENT-LVL IV: CPT | Mod: PBBFAC,,,

## 2025-01-08 PROCEDURE — 1160F RVW MEDS BY RX/DR IN RCRD: CPT | Mod: CPTII,,,

## 2025-01-08 PROCEDURE — 3066F NEPHROPATHY DOC TX: CPT | Mod: CPTII,,,

## 2025-01-08 PROCEDURE — 82043 UR ALBUMIN QUANTITATIVE: CPT

## 2025-01-08 RX ORDER — LEVOTHYROXINE SODIUM 125 UG/1
125 TABLET ORAL EVERY MORNING
Qty: 30 TABLET | Refills: 2 | Status: SHIPPED | OUTPATIENT
Start: 2025-01-08 | End: 2025-02-03 | Stop reason: SDUPTHER

## 2025-01-08 RX ORDER — POLYETHYLENE GLYCOL-3350 AND ELECTROLYTES WITH FLAVOR PACK 240; 5.84; 2.98; 6.72; 22.72 G/278.26G; G/278.26G; G/278.26G; G/278.26G; G/278.26G
POWDER, FOR SOLUTION ORAL
COMMUNITY
Start: 2024-12-05

## 2025-01-08 RX ORDER — OLMESARTAN MEDOXOMIL 20 MG/1
20 TABLET ORAL DAILY
Qty: 30 TABLET | Refills: 2 | Status: SHIPPED | OUTPATIENT
Start: 2025-01-08 | End: 2025-02-03 | Stop reason: SDUPTHER

## 2025-01-08 RX ORDER — METFORMIN HYDROCHLORIDE 1000 MG/1
1000 TABLET ORAL 2 TIMES DAILY WITH MEALS
Qty: 60 TABLET | Refills: 2 | Status: SHIPPED | OUTPATIENT
Start: 2025-01-08 | End: 2025-02-03 | Stop reason: SDUPTHER

## 2025-01-08 RX ORDER — FLUCONAZOLE 150 MG/1
TABLET ORAL
Qty: 2 TABLET | Refills: 0 | Status: SHIPPED | OUTPATIENT
Start: 2025-01-08

## 2025-01-08 RX ORDER — LISINOPRIL AND HYDROCHLOROTHIAZIDE 12.5; 2 MG/1; MG/1
1 TABLET ORAL DAILY
COMMUNITY
Start: 2024-12-26 | End: 2025-01-08

## 2025-01-08 NOTE — PROGRESS NOTES
SUBJECTIVE     Chief Complaint   Patient presents with    Medication Refill     Pt stated she is coming in for med refills       HPI  Lois Varela is a 49 y.o. female with multiple medical diagnoses as listed in the medical history and problem list that presents for follow-up for medication refills.     HPI     History of Present Illness    CHIEF COMPLAINT:  Patient presents today for diabetes management and related symptoms.    DIABETES:  She reports fluctuating blood sugar with current level of 11.6. She continues Metformin for management. She experiences vaginal itching associated with elevated glucose levels, noting improvement with previous antifungal treatment but recurrence with poor glycemic control.    CURRENT MEDICATIONS:  She takes Metformin for diabetes, Levothyroxine for thyroid, and Propranolol 20mg and Olmesartan for blood pressure management.    SOCIAL HISTORY:  She consumes alcohol occasionally.    FAMILY HISTORY:  Father had prostate cancer.    ROS:  General: -fever, -chills, -fatigue, -weight gain, -weight loss  Eyes: -vision changes, -redness, -discharge  ENT: -ear pain, -nasal congestion, -sore throat  Cardiovascular: -chest pain, -palpitations, -lower extremity edema  Respiratory: -cough, -shortness of breath  Gastrointestinal: -abdominal pain, -nausea, -vomiting, -diarrhea, -constipation, -blood in stool  Genitourinary: -dysuria, -hematuria, -frequency  Musculoskeletal: -joint pain, -muscle pain  Skin: -rash, -lesion, -itching  Neurological: -headache, -dizziness, -numbness, -tingling  Psychiatric: -anxiety, -depression, -sleep difficulty       PAST MEDICAL HISTORY:  Past Medical History:   Diagnosis Date    Hypertension     Hypothyroidism 2/26/2024    Neuropathy 2/26/2024    Type 2 diabetes mellitus, controlled      ALLERGIES AND MEDICATIONS: updated and reviewed.  Review of patient's allergies indicates:  No Known Allergies  Current Outpatient Medications   Medication Sig Dispense  "Refill    blood sugar diagnostic (RELION PRIME TEST STRIPS) Strp 1 strip by Misc.(Non-Drug; Combo Route) route 3 (three) times daily. 100 strip 3    blood-glucose meter (TRUE METRIX GLUCOSE METER) kit USE AS DIRECTED      TREVON-BHARGAV 240-22.72-6.72 -5.84 gram SolR Take by mouth.      fluconazole (DIFLUCAN) 150 MG Tab Take 1 tab (150 mg) by mouth once daily x 1 day. May take 1 tab by mouth after 72 hrs from first dose. 2 tablet 0    levothyroxine (SYNTHROID) 125 MCG tablet Take 1 tablet (125 mcg total) by mouth every morning. 30 tablet 2    metFORMIN (GLUCOPHAGE) 1000 MG tablet Take 1 tablet (1,000 mg total) by mouth 2 (two) times daily with meals. 60 tablet 2    olmesartan (BENICAR) 20 MG tablet Take 1 tablet (20 mg total) by mouth once daily. Para la presion 30 tablet 2    semaglutide (OZEMPIC) 0.25 mg or 0.5 mg (2 mg/3 mL) pen injector Inject 0.25 mg into the skin every 7 days. 1.5 mL 0     No current facility-administered medications for this visit.     OBJECTIVE     Physical Exam  Vitals:    01/08/25 1351   BP: 128/80   Pulse: 76   Resp: 18   Temp: 98.1 °F (36.7 °C)    Body mass index is 31.75 kg/m².  Weight: 82.6 kg (182 lb 1.6 oz)   Height: 5' 3.5" (161.3 cm)     Physical Exam    Physical Exam    General: No acute distress. Well-developed. Well-nourished.  Eyes: EOMI. Sclerae anicteric.  HENT: Normocephalic. Atraumatic. Nares patent. Moist oral mucosa.  Cardiovascular: Regular rate. Regular rhythm. No murmurs. No rubs. No gallops. Normal S1, S2.  Respiratory: Normal respiratory effort. Clear to auscultation bilaterally. No rales. No rhonchi. No wheezing.  Abdomen: Soft. Non-tender. Non-distended. Normoactive bowel sounds.  Musculoskeletal: No  obvious deformity.  Extremities: No lower extremity edema.  Neurological: Alert & oriented x3. No slurred speech. Normal gait.  Psychiatric: Normal mood. Normal affect. Good insight. Good judgment.  Skin: Warm. Dry. No rash.       Health Maintenance         Date Due " Completion Date    Foot Exam Never done ---    Diabetic Eye Exam Never done ---    Low Dose Statin Never done ---    Pneumococcal Vaccines (Age 50+) (2 of 2 - PCV) 06/01/2017 6/1/2016    Mammogram 06/19/2018 6/19/2017    Colorectal Cancer Screening Never done ---    Shingles Vaccine (1 of 2) Never done ---    Influenza Vaccine (1) 09/01/2024 10/11/2017    COVID-19 Vaccine (3 - 2024-25 season) 11/22/2025 (Originally 9/1/2024) 12/13/2021    Hemoglobin A1c 02/25/2025 11/25/2024    Lipid Panel 11/25/2025 11/25/2024    Diabetes Urine Screening 01/08/2026 1/8/2025    TETANUS VACCINE 03/27/2027 3/27/2017    RSV Vaccine (Age 60+ and Pregnant patients) (1 - 1-dose 75+ series) 01/30/2049 ---            ASSESSMENT     49 y.o. female with     1. Primary hypertension    2. Type 2 diabetes mellitus with diabetic polyneuropathy, without long-term current use of insulin    3. Other specified hypothyroidism    4. Vaginal yeast infection      PLAN:     1. Primary hypertension  Ongoing. Refilled olmesartan. Advised to maintain a low Na diet(<2g/day), exercise, and keep BP log to present to next visit   - olmesartan (BENICAR) 20 MG tablet; Take 1 tablet (20 mg total) by mouth once daily. Para la presion  Dispense: 30 tablet; Refill: 2    2. Type 2 diabetes mellitus with diabetic polyneuropathy, without long-term current use of insulin  Uncontrolled. Refilled metformin. Started semaglutide. Counseled patient extensively regarding importance of eating a prudent diet and exercising?     Denies Hx of medullary thyroid carcinoma (MTC) hx or family hx, or multiple endocrine neoplasia syndrome type 2 (MEN 2).  No Hx of pancreatitis.   Inject 0.25 mg once weekly for 4 weeks, then increase to 0.5 mg weekly. No need to take with food. Reviewed MOA, s/e and timing.    Make sure to get a good bowel regimen.  I recommend stool softener daily and stimulant laxative every 2-3 days if no good bowel movement.    - metFORMIN (GLUCOPHAGE) 1000 MG tablet;  Take 1 tablet (1,000 mg total) by mouth 2 (two) times daily with meals.  Dispense: 60 tablet; Refill: 2  - semaglutide (OZEMPIC) 0.25 mg (2 mg/3 mL) pen injector; Inject 0.25 mg into the skin every 7 days.  Dispense: 1.5 mL; Refill: 0  - Ambulatory referral/consult to Diabetes Education; Future  - Microalbumin/Creatinine Ratio, Urine; Future    3. Other specified hypothyroidism  Ongoing. Refilled levothyroxine.   - levothyroxine (SYNTHROID) 125 MCG tablet; Take 1 tablet (125 mcg total) by mouth every morning.  Dispense: 30 tablet; Refill: 2    4. Vaginal yeast infection  Ongoing. Refilled fluconazole. Counseled patient on the importance of managing blood sugars to minimize vaginal yeast.   - fluconazole (DIFLUCAN) 150 MG Tab; Take 1 tab (150 mg) by mouth once daily x 1 day. May take 1 tab by mouth after 72 hrs from first dose.  Dispense: 2 tablet; Refill: 0    Assessment & Plan    IMPRESSION:  - Assessed patient's uncontrolled diabetes with HbA1c of 11.6%, necessitating escalation to injectable therapy  - Initiated GLP-1 receptor agonist (likely semaglutide) for glycemic control and potential weight loss benefits  - Continued oral hypoglycemic (metformin) as part of diabetes management  - Maintained current antihypertensive and thyroid medication regimens  - Addressed recurrent vaginal candidiasis, likely exacerbated by hyperglycemia    DIABETES:  - Educated the patient on the relationship between uncontrolled diabetes and recurrent fungal infections.  - Explained potential complications of uncontrolled diabetes, including vision problems, cardiovascular disease, and kidney damage leading to dialysis.  - Evaluated that the patient's glucose is not well-controlled, with current level at 11.6 mmol/L.  - Initiated weekly injectable diabetes medication (semaglutide) at 0.25 mg, with instructions to start that evening and continue weekly.  - Continued metformin for diabetes management.  - Ordered urine test for kidney  function assessment.  - Referred the patient to diabetes education class for nutritional guidance and diabetes management strategies.  - Advised on dietary changes and explained potential side effects of new medication.  - Scheduled follow up in 4 weeks for dose increase of semaglutide to 0.5 mg.  - Instructed the patient to follow up before running out of last injection of new diabetes medication.  - Advised the patient to contact the office if insurance issues arise with new medication prescriptions.  - Discussed dietary modifications for diabetes management, emphasizing protein sources and limiting carbohydrates.  - Recommend the use of monk fruit as a natural sugar substitute.  - Referred the patient to a diabetes class for comprehensive dietary education.  - Patient to modify diet to reduce sugar and carbohydrate intake, especially limiting tortillas, white rice, and sugary drinks.  - Recommend increasing consumption of protein-rich foods such as chicken, eggs, and lean meats.  - Patient to incorporate vegetables and small amounts of beans into meals.  - Recommend using lettuce wraps as bread substitutes.  - Patient to stay hydrated by drinking adequate water.  - Recommend ensuring regular bowel movements, at least every 3 days.    HYPERTENSION:  - Continued olmesartan for blood pressure control.    THYROID DISORDER:  - Emphasized the importance of consistent thyroid medication use.  - Continued levothyroxine 125 mcg daily for thyroid management, emphasizing importance of consistent use.  - Instructed the patient to take levothyroxine in the morning on an empty stomach.    VAGINAL YEAST INFECTION:  - Started diflucan for vaginal yeast infection, with instructions to use as needed and not continuously.    BREAST CANCER SCREENING:  - Mammogram ordered (due to last one being in 2017).    FOLLOW UP:  - Follow up on February 3rd as scheduled.         ADRIENNE Winters  [facility]  01/08/2025 1:59  PM    I spent a total of 30 minutes on the day of the visit.This includes face to face time and non-face to face time preparing to see the patient (eg, review of tests), obtaining and/or reviewing separately obtained history, documenting clinical information in the electronic or other health record, independently interpreting results and communicating results to the patient/family/caregiver, or care coordinator.     Follow up in about 26 days (around 2/3/2025).    This note was generated with the assistance of ambient listening technology. Verbal consent was obtained by the patient and accompanying visitor(s) for the recording of patient appointment to facilitate this note. I attest to having reviewed and edited the generated note for accuracy, though some syntax or spelling errors may persist. Please contact the author of this note for any clarification.

## 2025-01-09 LAB
ALBUMIN/CREAT UR: 13 UG/MG (ref 0–30)
CREAT UR-MCNC: 46 MG/DL (ref 15–325)
MICROALBUMIN UR DL<=1MG/L-MCNC: 6 UG/ML

## 2025-01-31 ENCOUNTER — CLINICAL SUPPORT (OUTPATIENT)
Dept: DIABETES | Facility: CLINIC | Age: 50
End: 2025-01-31
Payer: MEDICAID

## 2025-01-31 DIAGNOSIS — E11.42 TYPE 2 DIABETES MELLITUS WITH DIABETIC POLYNEUROPATHY, WITHOUT LONG-TERM CURRENT USE OF INSULIN: ICD-10-CM

## 2025-01-31 PROCEDURE — 99999 PR PBB SHADOW E&M-EST. PATIENT-LVL I: CPT | Mod: PBBFAC,,,

## 2025-01-31 PROCEDURE — 99999PBSHW PR PBB SHADOW TECHNICAL ONLY FILED TO HB: Mod: PBBFAC,,,

## 2025-01-31 PROCEDURE — G0108 DIAB MANAGE TRN  PER INDIV: HCPCS | Mod: PBBFAC

## 2025-01-31 PROCEDURE — 99211 OFF/OP EST MAY X REQ PHY/QHP: CPT | Mod: PBBFAC

## 2025-02-03 ENCOUNTER — OFFICE VISIT (OUTPATIENT)
Facility: CLINIC | Age: 50
End: 2025-02-03
Payer: MEDICAID

## 2025-02-03 VITALS
HEART RATE: 80 BPM | BODY MASS INDEX: 31.09 KG/M2 | TEMPERATURE: 99 F | WEIGHT: 182.13 LBS | OXYGEN SATURATION: 100 % | HEIGHT: 64 IN | DIASTOLIC BLOOD PRESSURE: 80 MMHG | RESPIRATION RATE: 18 BRPM | SYSTOLIC BLOOD PRESSURE: 124 MMHG

## 2025-02-03 DIAGNOSIS — I10 PRIMARY HYPERTENSION: ICD-10-CM

## 2025-02-03 DIAGNOSIS — Z71.2 ENCOUNTER TO DISCUSS TEST RESULTS: ICD-10-CM

## 2025-02-03 DIAGNOSIS — E11.42 TYPE 2 DIABETES MELLITUS WITH DIABETIC POLYNEUROPATHY, WITHOUT LONG-TERM CURRENT USE OF INSULIN: Primary | ICD-10-CM

## 2025-02-03 DIAGNOSIS — E03.8 OTHER SPECIFIED HYPOTHYROIDISM: ICD-10-CM

## 2025-02-03 PROCEDURE — 99999 PR PBB SHADOW E&M-EST. PATIENT-LVL III: CPT | Mod: PBBFAC,,,

## 2025-02-03 PROCEDURE — 3066F NEPHROPATHY DOC TX: CPT | Mod: CPTII,,,

## 2025-02-03 PROCEDURE — 1160F RVW MEDS BY RX/DR IN RCRD: CPT | Mod: CPTII,,,

## 2025-02-03 PROCEDURE — 99214 OFFICE O/P EST MOD 30 MIN: CPT | Mod: S$PBB,,,

## 2025-02-03 PROCEDURE — 3061F NEG MICROALBUMINURIA REV: CPT | Mod: CPTII,,,

## 2025-02-03 PROCEDURE — 3008F BODY MASS INDEX DOCD: CPT | Mod: CPTII,,,

## 2025-02-03 PROCEDURE — 4010F ACE/ARB THERAPY RXD/TAKEN: CPT | Mod: CPTII,,,

## 2025-02-03 PROCEDURE — 1159F MED LIST DOCD IN RCRD: CPT | Mod: CPTII,,,

## 2025-02-03 PROCEDURE — 99213 OFFICE O/P EST LOW 20 MIN: CPT | Mod: PBBFAC,PN

## 2025-02-03 PROCEDURE — 3079F DIAST BP 80-89 MM HG: CPT | Mod: CPTII,,,

## 2025-02-03 PROCEDURE — 3074F SYST BP LT 130 MM HG: CPT | Mod: CPTII,,,

## 2025-02-03 RX ORDER — LEVOTHYROXINE SODIUM 125 UG/1
125 TABLET ORAL EVERY MORNING
Qty: 30 TABLET | Refills: 2 | Status: SHIPPED | OUTPATIENT
Start: 2025-02-03 | End: 2025-05-04

## 2025-02-03 RX ORDER — METFORMIN HYDROCHLORIDE 1000 MG/1
1000 TABLET ORAL 2 TIMES DAILY WITH MEALS
Qty: 60 TABLET | Refills: 2 | Status: SHIPPED | OUTPATIENT
Start: 2025-02-03 | End: 2025-05-04

## 2025-02-03 RX ORDER — OLMESARTAN MEDOXOMIL 20 MG/1
20 TABLET ORAL DAILY
Qty: 30 TABLET | Refills: 2 | Status: SHIPPED | OUTPATIENT
Start: 2025-02-03 | End: 2025-05-04
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
## 2025-02-03 NOTE — PROGRESS NOTES
Diabetes Care Specialist Progress Note  Author: Marilyn Zhao RD  Date: 2/3/2025    Intake    Program Intake  Reason for Diabetes Program Visit:: Initial Diabetes Assessment  Current diabetes risk level:: high  In the last month, have you used the ER or been admitted to the hospital: No  Permission to speak with others about care:: yes (Pt's daughter is in office during appt and assisting with translation)    Current Diabetes Treatment: Oral Medications  Oral Medication Type/Dose: Metformin    Continuous Glucose Monitoring  Patient has CGM: No    Lab Results   Component Value Date    HGBA1C 11.6 (H) 11/25/2024       There is no height or weight on file to calculate BMI.    Lifestyle Coping Support & Clinical    Lifestyle/Coping/Support  Learning Barriers:: None  Psychosocial/Coping Skills Assessment Completed: : No  Deffered due to:: Time  Area of need?: Deferred    Problem Review  Active Comorbidities: Hypertension, Obesity, Neuropathy    Diabetes Self-Management Skills Assessment    Medication Skills Assessment  Patient is able to identify current diabetes medications, dosages, and appropriate timing of medications.: yes  Patient reports problems or concerns with current medication regimen.: no  Medication Skills Assessment Completed:: Yes  Assessment indicates:: Adequate understanding  Area of need?: No    Diabetes Disease Process/Treatment Options  Diabetes Type?: Type II  Is patient aware of what causes diabetes?: No  Does patient understand the pathophysiology of diabetes?: No  Diabetes Disease Process/Treatment Options: Skills Assessment Completed: Yes  Assessment indicates:: Knowledge deficit, Instruction Needed  Area of need?: Yes    Nutrition/Healthy Eating  Meal Plan 24 Hour Recall - Breakfast: Eggs with 2 slices of wheat bread  Meal Plan 24 Hour Recall - Lunch: Skips or rice, beans, chicken, or fried fish  Meal Plan 24 Hour Recall - Dinner: Fish and plantains or green bananas  Meal Plan 24 Hour Recall -  Snack: Crackers, 3-4 cookies, fruit, cheese, nuts  Meal Plan 24 Hour Recall - Beverage: Orange juice 1x/day  Challenges to healthy eating:: portion control  Nutrition/Healthy Eating Skills Assessment Completed:: Yes  Assessment indicates:: Knowledge deficit, Instruction Needed  Area of need?: Yes    Physical Activity/Exercise  Physical Activity/Exercise Skills Assessment Completed: : No  Deffered due to:: Time  Area of need?: Deferred    Home Blood Glucose Monitoring  Patient states that blood sugar is checked at home daily.: yes  Monitoring Method:: home glucometer  Fasting BG range history:: 150  How often do you check your blood sugar?: BID  Home Blood Glucose Monitoring Skills Assessment Completed: : Yes  Assessment indicates:: Adequate understanding  Area of need?: No    Acute Complications  Acute Complications Skills Assessment Completed: : No  Deffered due to:: Time  Area of need?: Deferred    Chronic Complications  Chronic Complications Skills Assessment Completed: : No  Deferred due to:: Time  Area of need?: Deferred    Assessment Summary and Plan    Based on today's diabetes care assessment, the following areas of need were identified:      Identified Areas of Need      Medication/Current Diabetes Treatment: No   Lifestyle Coping/Support: Deferred   Diabetes Disease Process/Treatment Options: Yes, Provided DM management guide and provided instruction regarding signs and symptoms, risk factors of diabetes, increased risk for cardio and cerebral vascular events.  Instructed patient on what is diabetes and the progression of the disease. Discussed significance of current A1c and blood glucose goals.   Nutrition/Healthy Eating: Yes , see care plan.   Physical Activity/Exercise: Deferred    Home Blood Glucose Monitoring: No    Acute Complications: Deferred    Chronic Complications: Deferred     Today's interventions were provided through individual discussion, instruction, and written materials were provided.   "    Patient verbalized understanding of instruction and written materials.  Pt was able to return back demonstration of instructions today. Patient understood key points, needs reinforcement and further instruction.     Diabetes Self-Management Care Plan:    Today's Diabetes Self-Management Care Plan was developed with Lois's input. Lois has agreed to work toward the following goal(s) to improve his/her overall diabetes control.      Care Plan: Diabetes Management   Updates made since 2/4/2024 12:00 AM        Problem: Healthy Eating         Goal: Eat 2-3 meals daily with 30-45g/2-3 servings of Carbohydrate per meal. Limit snacking in between meal to 1 serving (15 grams).    Start Date: 1/31/2025   Expected End Date: 5/3/2025   This Visit's Progress: Deferred   Priority: High   Barriers: Knowledge deficit   Note:    Reviewed meal planning and general nutritional counseling with regards to diabetes management. Meal habits reviewed. Reviewed basic Carbohydrate Counting principles--Food groups, label reading, use of dry measuring cups for accurate portion sizes. Provided pt with handout reviewing plate method and encouraged pt to use "fistfuls" to measure carbohydrates if unable to read label. Educator provided pt BG logs to monitor BG and encouraged pt to bring log to MD appts.       Task: Reviewed the sources and role of Carbohydrate, Protein, and Fat and how each nutrient impacts blood sugar. Completed 1/31/2025        Task: Explained how to count carbohydrates using the food label and the use of dry measuring cups for accurate carb counting. Completed 1/31/2025        Task: Review the importance of balancing carbohydrates with each meal using portion control techniques to count servings of carbohydrate and label reading to identify serving size and amount of total carbs per serving. Completed 1/31/2025        Task: Provided Sample plate method and reviewed the use of the plate to estimate amounts of carbohydrate per " meal. Completed 1/31/2025        Follow Up Plan     Follow up in about 4 weeks (around 2/28/2025) for 1-month F/U.    Today's care plan and follow up schedule was discussed with patient.  Lois verbalized understanding of the care plan, goals, and agrees to follow up plan.        The patient was encouraged to communicate with his/her health care provider/physician and care team regarding his/her condition(s) and treatment.  I provided the patient with my contact information today and encouraged to contact me via phone or [facility] Patient Portal as needed.     Length of Visit   Total Time: 60 Minutes

## 2025-02-10 ENCOUNTER — ANESTHESIA EVENT (OUTPATIENT)
Dept: ENDOSCOPY | Facility: HOSPITAL | Age: 50
End: 2025-02-10
Payer: MEDICAID

## 2025-02-10 NOTE — ANESTHESIA PREPROCEDURE EVALUATION
02/10/2025  Lois Varela is a 50 y.o., female.    Procedure: COLONOSCOPY, SCREENING, LOW RISK PATIENT (N/A)         Patient Active Problem List   Diagnosis    DM (diabetes mellitus), type 2    Essential hypertension    Pelvic pain in female    Allergic conjunctivitis of both eyes    Hypothyroidism    Neuropathy    Obesity with body mass index 30 or greater    Pseudoangiomatous stromal hyperplasia of breast       Past Medical History:   Diagnosis Date    Hypertension     Hypothyroidism 2/26/2024    Neuropathy 2/26/2024    Type 2 diabetes mellitus, controlled        ECHO: No results found for this or any previous visit.      There is no height or weight on file to calculate BMI.    Tobacco Use: Unknown (2/3/2025)    Patient History     Smoking Tobacco Use: Never     Smokeless Tobacco Use: Unknown     Passive Exposure: Not on file       Social History     Substance and Sexual Activity   Drug Use No        Alcohol Use: Not At Risk (10/27/2024)    Received [facility]    AUDIT-C     Frequency of Alcohol Consumption: Monthly or less     Average Number of Drinks: 1 or 2     Frequency of Binge Drinking: Never       Review of patient's allergies indicates:  No Known Allergies      Airway:  No value filed.    Pre-op Assessment    I have reviewed the Patient Summary Reports.     I have reviewed the Nursing Notes. I have reviewed the NPO Status.   I have reviewed the Medications.     Review of Systems  Anesthesia Hx:  No problems with previous Anesthesia             Denies Family Hx of Anesthesia complications.    Denies Personal Hx of Anesthesia complications.                    Hematology/Oncology:  Hematology Normal   Oncology Normal                                   EENT/Dental:  EENT/Dental Normal           Cardiovascular:  Exercise tolerance: good   Hypertension                                           Pulmonary:  Pulmonary Normal                       Renal/:  Renal/ Normal                 Hepatic/GI:  Hepatic/GI Normal                    Musculoskeletal:  Musculoskeletal Normal                Neurological:  Neurology Normal                                      Endocrine:  Diabetes Hypothyroidism          Dermatological:  Skin Normal    Psych:  Psychiatric Normal                     Anesthesia Plan  Type of Anesthesia, risks & benefits discussed:    Anesthesia Type: Gen Natural Airway  Intra-op Monitoring Plan: Standard ASA Monitors  Post Op Pain Control Plan: multimodal analgesia and IV/PO Opioids PRN  Induction:  IV  Informed Consent: Informed consent signed with the Patient and all parties understand the risks and agree with anesthesia plan.  All questions answered. Patient consented to blood products? No  ASA Score: 2  Day of Surgery Review of History & Physical: H&P Update referred to the surgeon/provider.    Ready For Surgery From Anesthesia Perspective.   .

## 2025-02-12 ENCOUNTER — TELEPHONE (OUTPATIENT)
Dept: ENDOSCOPY | Facility: HOSPITAL | Age: 50
End: 2025-02-12

## 2025-02-17 ENCOUNTER — ANESTHESIA (OUTPATIENT)
Dept: ENDOSCOPY | Facility: HOSPITAL | Age: 50
End: 2025-02-17
Payer: MEDICAID

## 2025-02-17 ENCOUNTER — HOSPITAL ENCOUNTER (OUTPATIENT)
Facility: HOSPITAL | Age: 50
Discharge: HOME | End: 2025-02-17
Attending: STUDENT IN AN ORGANIZED HEALTH CARE EDUCATION/TRAINING PROGRAM | Admitting: INTERNAL MEDICINE
Payer: MEDICAID

## 2025-02-17 VITALS
SYSTOLIC BLOOD PRESSURE: 164 MMHG | HEART RATE: 65 BPM | RESPIRATION RATE: 11 BRPM | DIASTOLIC BLOOD PRESSURE: 77 MMHG | OXYGEN SATURATION: 99 % | TEMPERATURE: 98 F

## 2025-02-17 DIAGNOSIS — Z12.11 ENCOUNTER FOR SCREENING COLONOSCOPY: Primary | ICD-10-CM

## 2025-02-17 PROCEDURE — 99900035 HC TECH TIME PER 15 MIN (STAT)

## 2025-02-17 PROCEDURE — 25000003 PHARM REV CODE 250: Performed by: INTERNAL MEDICINE

## 2025-02-17 PROCEDURE — 94761 N-INVAS EAR/PLS OXIMETRY MLT: CPT

## 2025-02-17 PROCEDURE — 63600175 PHARM REV CODE 636 W HCPCS: Performed by: NURSE ANESTHETIST, CERTIFIED REGISTERED

## 2025-02-17 PROCEDURE — 45378 DIAGNOSTIC COLONOSCOPY: CPT | Mod: 74 | Performed by: INTERNAL MEDICINE

## 2025-02-17 PROCEDURE — 82962 GLUCOSE BLOOD TEST: CPT | Performed by: INTERNAL MEDICINE

## 2025-02-17 PROCEDURE — 37000008 HC ANESTHESIA 1ST 15 MINUTES: Performed by: INTERNAL MEDICINE

## 2025-02-17 PROCEDURE — 37000009 HC ANESTHESIA EA ADD 15 MINS: Performed by: INTERNAL MEDICINE

## 2025-02-17 RX ORDER — LIDOCAINE HYDROCHLORIDE 20 MG/ML
INJECTION, SOLUTION EPIDURAL; INFILTRATION; INTRACAUDAL; PERINEURAL
Status: DISCONTINUED | OUTPATIENT
Start: 2025-02-17 | End: 2025-02-17

## 2025-02-17 RX ORDER — PROPOFOL 10 MG/ML
VIAL (ML) INTRAVENOUS CONTINUOUS PRN
Status: DISCONTINUED | OUTPATIENT
Start: 2025-02-17 | End: 2025-02-17

## 2025-02-17 RX ORDER — PROPOFOL 10 MG/ML
VIAL (ML) INTRAVENOUS
Status: DISCONTINUED | OUTPATIENT
Start: 2025-02-17 | End: 2025-02-17

## 2025-02-17 RX ORDER — LISINOPRIL AND HYDROCHLOROTHIAZIDE 12.5; 2 MG/1; MG/1
1 TABLET ORAL DAILY
COMMUNITY

## 2025-02-17 RX ORDER — SODIUM CHLORIDE 9 MG/ML
INJECTION, SOLUTION INTRAVENOUS CONTINUOUS
Status: DISCONTINUED | OUTPATIENT
Start: 2025-02-17 | End: 2025-02-17 | Stop reason: HOSPADM

## 2025-02-17 RX ADMIN — PROPOFOL 150 MCG/KG/MIN: 10 INJECTION, EMULSION INTRAVENOUS at 11:02

## 2025-02-17 RX ADMIN — SODIUM CHLORIDE: 9 INJECTION, SOLUTION INTRAVENOUS at 11:02

## 2025-02-17 RX ADMIN — LIDOCAINE HYDROCHLORIDE 60 MG: 20 INJECTION, SOLUTION EPIDURAL; INFILTRATION; INTRACAUDAL; PERINEURAL at 11:02

## 2025-02-17 RX ADMIN — PROPOFOL 100 MG: 10 INJECTION, EMULSION INTRAVENOUS at 11:02

## 2025-02-17 NOTE — ANESTHESIA POSTPROCEDURE EVALUATION
Anesthesia Post Evaluation    Patient: Lois Varela    Procedure(s) Performed: Procedure(s) (LRB):  COLONOSCOPY, SCREENING, LOW RISK PATIENT (N/A)    Final Anesthesia Type: general      Patient location during evaluation: PACU  Patient participation: Yes- Able to Participate  Level of consciousness: awake and alert  Post-procedure vital signs: reviewed and stable  Pain management: adequate  Airway patency: patent    PONV status at discharge: No PONV  Anesthetic complications: no      Cardiovascular status: blood pressure returned to baseline  Respiratory status: unassisted  Hydration status: euvolemic            Vitals Value Taken Time   /77 02/17/25 12:30   Temp 36.5 °C (97.7 °F) 02/17/25 12:10   Pulse 65 02/17/25 12:30   Resp 11 02/17/25 12:30   SpO2 99 % 02/17/25 12:30         Event Time   Out of Recovery 12:18:00         Pain/Nathaly Score: Nathaly Score: 10 (2/17/2025 12:20 PM)

## 2025-02-17 NOTE — TRANSFER OF CARE
Anesthesia Transfer of Care Note    Patient: Lois Varela    Procedure(s) Performed: Procedure(s) (LRB):  COLONOSCOPY, SCREENING, LOW RISK PATIENT (N/A)    Patient location: PACU    Anesthesia Type: general    Transport from OR: Transported from OR on room air with adequate spontaneous ventilation    Post pain: adequate analgesia    Post assessment: no apparent anesthetic complications    Post vital signs: stable    Level of consciousness: awake    Nausea/Vomiting: no nausea/vomiting    Complications: none    Transfer of care protocol was followed      Last vitals: Visit Vitals  BP (!) 180/84 (BP Location: Left arm, Patient Position: Sitting)   Pulse 63   Temp 36.2 °C (97.2 °F) (Temporal)   Resp 18   SpO2 96%   Breastfeeding No

## 2025-02-17 NOTE — H&P
Short Stay Endoscopy History and Physical    PCP - Zayra Gallego NP    Procedure - screening colonoscopy.      HPI:  This is a 50 y.o. female here for evaluation of screening colonoscopy.      ROS:  Constitutional: No fevers, chills, No weight loss  ENT: No allergies  CV: No chest pain  Pulm: No shortness of breath  GI: see HPI  Derm: No rash    Medical History:  has a past medical history of Hypertension, Hypothyroidism (2/26/2024), Neuropathy (2/26/2024), and Type 2 diabetes mellitus, controlled.    Surgical History:  has a past surgical history that includes Hysterectomy and Vaginal delivery.    Family History: family history includes No Known Problems in her brother, father, mother, and sister.. Otherwise no colon cancer, inflammatory bowel disease, or GI malignancies.    Social History:  reports that she has never smoked. She does not have any smokeless tobacco history on file. She reports that she does not drink alcohol and does not use drugs.    Review of patient's allergies indicates:  No Known Allergies    Medications:   Prescriptions Prior to Admission[1]      Objective Findings:    Vital Signs: see nursing notes  Physical Exam:  General Appearance: Well appearing in no acute distress  Eyes:    No scleral icterus  ENT: Neck supple  Lungs: CTA anteriorly  Heart:  S1, S2 normal, no murmurs heard  Abdomen: Soft, non tender, non distended with positive bowel sounds. No hepatosplenomegaly, ascites, or mass  Extremities: no edema  Skin: No rash      Labs:  Lab Results   Component Value Date    WBC 7.18 11/25/2024    HGB 12.9 11/25/2024    HCT 37.4 11/25/2024     11/25/2024    CHOL 205 (H) 11/25/2024    TRIG 127 11/25/2024    HDL 43 11/25/2024    ALT 16 11/25/2024    AST 15 11/25/2024     11/25/2024    K 3.7 11/25/2024     11/25/2024    CREATININE 0.7 11/25/2024    BUN 12 11/25/2024    CO2 26 11/25/2024    TSH 3.350 11/25/2024    HGBA1C 11.6 (H) 11/25/2024       I have explained the  risks and benefits of endoscopy procedures to the patient including but not limited to bleeding, perforation, infection, and death.    Zora Hoff MD       [1]   Medications Prior to Admission   Medication Sig Dispense Refill Last Dose/Taking    levothyroxine (SYNTHROID) 125 MCG tablet Take 1 tablet (125 mcg total) by mouth every morning. 30 tablet 2 2/16/2025    lisinopriL-hydrochlorothiazide (PRINZIDE,ZESTORETIC) 20-12.5 mg per tablet Take 1 tablet by mouth once daily.   2/16/2025    metFORMIN (GLUCOPHAGE) 1000 MG tablet Take 1 tablet (1,000 mg total) by mouth 2 (two) times daily with meals. 60 tablet 2 2/16/2025    blood sugar diagnostic (RELION PRIME TEST STRIPS) Strp 1 strip by Misc.(Non-Drug; Combo Route) route 3 (three) times daily. 100 strip 3     blood-glucose meter (TRUE METRIX GLUCOSE METER) kit USE AS DIRECTED       fluconazole (DIFLUCAN) 150 MG Tab Take 1 tab (150 mg) by mouth once daily x 1 day. May take 1 tab by mouth after 72 hrs from first dose. 2 tablet 0 Unknown    GAVILYTE-C 240-22.72-6.72 -5.84 gram SolR Take by mouth.       olmesartan (BENICAR) 20 MG tablet Take 1 tablet (20 mg total) by mouth once daily. Para la presion 30 tablet 2 Unknown    semaglutide (OZEMPIC) 0.25 mg or 0.5 mg (2 mg/3 mL) pen injector Inject 0.25 mg into the skin every 7 days. 3 mL 0 2/5/2025

## 2025-02-17 NOTE — PROVATION PATIENT INSTRUCTIONS
Discharge Summary/Instructions after an Endoscopic Procedure  Patient Name: Lois Peña  Patient MRN: [id number]  Patient YOB: 1975 Monday, February 17, 2025  Zora Hoff MD  Dear patient,  As a result of recent federal legislation (The Federal Cures Act), you may   receive lab or pathology results from your procedure in your [facility]   account before your physician is able to contact you. Your physician or   their representative will relay the results to you with their   recommendations at their soonest availability.  Thank you,  RESTRICTIONS:  During your procedure today, you received medications for sedation.  These   medications may affect your judgment, balance and coordination.  Therefore,   for 24 hours, you have the following restrictions:   - DO NOT drive a car, operate machinery, make legal/financial decisions,   sign important papers or drink alcohol.    ACTIVITY:  Today: no heavy lifting, straining or running due to procedural   sedation/anesthesia.  The following day: return to full activity including work.  DIET:  Eat and drink normally unless instructed otherwise.     TREATMENT FOR COMMON SIDE EFFECTS:  - Mild abdominal pain, nausea, belching, bloating or excessive gas:  rest,   eat lightly and use a heating pad.  - Sore Throat: treat with throat lozenges and/or gargle with warm salt   water.  - Because air was used during the procedure, expelling large amounts of air   from your rectum or belching is normal.  - If a bowel prep was taken, you may not have a bowel movement for 1-3 days.    This is normal.  SYMPTOMS TO WATCH FOR AND REPORT TO YOUR PHYSICIAN:  1. Abdominal pain or bloating, other than gas cramps.  2. Chest pain.  3. Back pain.  4. Signs of infection such as: chills or fever occurring within 24 hours   after the procedure.  5. Rectal bleeding, which would show as bright red, maroon, or black stools.   (A tablespoon of blood from the rectum is not serious,  especially if   hemorrhoids are present.)  6. Vomiting.  7. Weakness or dizziness.  GO DIRECTLY TO THE NEAREST EMERGENCY ROOM IF YOU HAVE ANY OF THE FOLLOWING:      Difficulty breathing              Chills and/or fever over 101 F   Persistent vomiting and/or vomiting blood   Severe abdominal pain   Severe chest pain   Black, tarry stools   Bleeding- more than one tablespoon   Any other symptom or condition that you feel may need urgent attention  Your doctor recommends these additional instructions:  If any biopsies were taken, your doctors clinic will contact you in 1 to 2   weeks with any results.  - Discharge patient to home (with escort).   - Resume regular diet.   - Continue present medications.   - Repeat colonoscopy in 6 months because the bowel preparation was poor.   - I would recommend taking Miralax daily for 7 days leading up to her next   procedure and give split colonoscopy prep. She should drink the entire   solution, which was not done for this exam.   For questions, problems or results please call your physician - Zora Hoff MD at Work:  [phone].  [facility] [place], EMERGENCY ROOM PHONE NUMBER: [phone]  IF A COMPLICATION OR EMERGENCY SITUATION ARISES AND YOU ARE UNABLE TO REACH   YOUR PHYSICIAN - GO DIRECTLY TO THE EMERGENCY ROOM.  MD Zora Bender MD  2/17/2025 12:05:01 PM  This report has been verified and signed electronically.  Dear patient,  As a result of recent federal legislation (The Federal Cures Act), you may   receive lab or pathology results from your procedure in your [facility]   account before your physician is able to contact you. Your physician or   their representative will relay the results to you with their   recommendations at their soonest availability.  Thank you,  PROVATION

## 2025-03-20 NOTE — PLAN OF CARE
1350 Pt arrived to PACU with OR team. VSS NAD noted willl continue to monitor   Addended by: JESSICA GONSALEZ on: 3/20/2025 03:52 PM     Modules accepted: Orders

## 2025-04-01 ENCOUNTER — OFFICE VISIT (OUTPATIENT)
Dept: PRIMARY CARE CLINIC | Facility: CLINIC | Age: 50
End: 2025-04-01
Payer: MEDICAID

## 2025-04-01 ENCOUNTER — LAB VISIT (OUTPATIENT)
Dept: PRIMARY CARE CLINIC | Facility: CLINIC | Age: 50
End: 2025-04-01
Payer: MEDICAID

## 2025-04-01 VITALS
BODY MASS INDEX: 30.71 KG/M2 | DIASTOLIC BLOOD PRESSURE: 88 MMHG | WEIGHT: 179.88 LBS | TEMPERATURE: 98 F | HEART RATE: 72 BPM | OXYGEN SATURATION: 98 % | SYSTOLIC BLOOD PRESSURE: 138 MMHG | HEIGHT: 64 IN

## 2025-04-01 DIAGNOSIS — N89.8 ITCHING IN THE VAGINAL AREA: Primary | ICD-10-CM

## 2025-04-01 DIAGNOSIS — E11.42 TYPE 2 DIABETES MELLITUS WITH DIABETIC POLYNEUROPATHY, WITHOUT LONG-TERM CURRENT USE OF INSULIN: ICD-10-CM

## 2025-04-01 DIAGNOSIS — E03.8 OTHER SPECIFIED HYPOTHYROIDISM: ICD-10-CM

## 2025-04-01 DIAGNOSIS — I10 PRIMARY HYPERTENSION: ICD-10-CM

## 2025-04-01 DIAGNOSIS — Z12.31 ENCOUNTER FOR SCREENING MAMMOGRAM FOR BREAST CANCER: ICD-10-CM

## 2025-04-01 LAB
EAG (OHS): 275 MG/DL (ref 68–131)
HBA1C MFR BLD: 11.2 % (ref 4–5.6)

## 2025-04-01 PROCEDURE — 83036 HEMOGLOBIN GLYCOSYLATED A1C: CPT

## 2025-04-01 PROCEDURE — 3075F SYST BP GE 130 - 139MM HG: CPT | Mod: CPTII,,,

## 2025-04-01 PROCEDURE — 3079F DIAST BP 80-89 MM HG: CPT | Mod: CPTII,,,

## 2025-04-01 PROCEDURE — 3061F NEG MICROALBUMINURIA REV: CPT | Mod: CPTII,,,

## 2025-04-01 PROCEDURE — 99999 PR PBB SHADOW E&M-EST. PATIENT-LVL IV: CPT | Mod: PBBFAC,,,

## 2025-04-01 PROCEDURE — 99214 OFFICE O/P EST MOD 30 MIN: CPT | Mod: S$PBB,,,

## 2025-04-01 PROCEDURE — 99214 OFFICE O/P EST MOD 30 MIN: CPT | Mod: PBBFAC,PN

## 2025-04-01 PROCEDURE — 1160F RVW MEDS BY RX/DR IN RCRD: CPT | Mod: CPTII,,,

## 2025-04-01 PROCEDURE — 1159F MED LIST DOCD IN RCRD: CPT | Mod: CPTII,,,

## 2025-04-01 PROCEDURE — 36415 COLL VENOUS BLD VENIPUNCTURE: CPT

## 2025-04-01 PROCEDURE — G2211 COMPLEX E/M VISIT ADD ON: HCPCS | Mod: S$PBB,,,

## 2025-04-01 PROCEDURE — 4010F ACE/ARB THERAPY RXD/TAKEN: CPT | Mod: CPTII,,,

## 2025-04-01 PROCEDURE — 3008F BODY MASS INDEX DOCD: CPT | Mod: CPTII,,,

## 2025-04-01 PROCEDURE — 3066F NEPHROPATHY DOC TX: CPT | Mod: CPTII,,,

## 2025-04-01 RX ORDER — FLUCONAZOLE 150 MG/1
TABLET ORAL
Qty: 2 TABLET | Refills: 0 | Status: SHIPPED | OUTPATIENT
Start: 2025-04-01

## 2025-04-01 RX ORDER — LEVOTHYROXINE SODIUM 125 UG/1
125 TABLET ORAL EVERY MORNING
Qty: 30 TABLET | Refills: 2 | Status: SHIPPED | OUTPATIENT
Start: 2025-04-01 | End: 2025-06-30

## 2025-04-01 RX ORDER — METFORMIN HYDROCHLORIDE 1000 MG/1
1000 TABLET ORAL 2 TIMES DAILY WITH MEALS
Qty: 60 TABLET | Refills: 2 | Status: SHIPPED | OUTPATIENT
Start: 2025-04-01 | End: 2025-06-30

## 2025-04-01 NOTE — PROGRESS NOTES
SUBJECTIVE     Chief Complaint   Patient presents with    Vaginal Itching       HPI  Lois Varela is a 50 y.o. female with multiple medical diagnoses as listed in the medical history and problem list that presents for evaluation vaginal itching    HPI     History of Present Illness    CHIEF COMPLAINT:  Patient presents today for diabetes follow up    DIABETES:  She reports issues with Semaglutide injection technique, with incomplete medication administration due to improper injection method leaving residual medication in the device. Morning glucose readings remain elevated at 200. She attempted dietary modifications including consumption of green juice (containing pineapple, green apple, celery, and radish) for dinner without effective blood sugar reduction.    HYPERTENSION:  She continues olmesartan for blood pressure management with good response.    VISION:  She reports difficulty reading small text and uses OTC reading glasses which effectively improve her vision.    GYNECOLOGIC:  She reports vaginal itching. Previous treatment with Diflucan was effective in resolving symptoms.    Patient had mammogram completed at [facility] on 09/23/2024.       ROS:  General: -fever, -chills, -fatigue, -weight gain, -weight loss  Eyes: -vision changes, -redness, -discharge  ENT: -ear pain, -nasal congestion, -sore throat  Cardiovascular: -chest pain, -palpitations, -lower extremity edema  Respiratory: -cough, -shortness of breath  Gastrointestinal: -abdominal pain, -nausea, -vomiting, -diarrhea, -constipation, -blood in stool  Genitourinary: -dysuria, -hematuria, -frequency  Musculoskeletal: -joint pain, -muscle pain  Skin: -rash, -lesion  Neurological: -headache, -dizziness, -numbness, -tingling  Psychiatric: -anxiety, -depression, -sleep difficulty  Female Genitourinary: +vaginal itching or burning         PAST MEDICAL HISTORY:  Past Medical History:   Diagnosis Date    Hypertension     Hypothyroidism 2/26/2024    Neuropathy  "2/26/2024    Type 2 diabetes mellitus, controlled        ALLERGIES AND MEDICATIONS: updated and reviewed.  Review of patient's allergies indicates:  No Known Allergies  Current Medications[1]    OBJECTIVE     Physical Exam  Vitals:    04/01/25 1107   BP: 138/88   Pulse: 72   Temp: 98 °F (36.7 °C)    Body mass index is 30.88 kg/m².  Weight: 81.6 kg (179 lb 14.3 oz)   Height: 5' 4" (162.6 cm)     Physical Exam  Vitals reviewed.   Constitutional:       General: She is not in acute distress.  HENT:      Right Ear: External ear normal.      Left Ear: External ear normal.      Nose: Nose normal.      Mouth/Throat:      Mouth: Mucous membranes are moist.   Eyes:      Extraocular Movements: Extraocular movements intact.      Conjunctiva/sclera: Conjunctivae normal.      Pupils: Pupils are equal, round, and reactive to light.   Pulmonary:      Effort: Pulmonary effort is normal.   Abdominal:      General: There is no distension.      Palpations: Abdomen is soft.   Musculoskeletal:         General: No swelling. Normal range of motion.      Cervical back: Normal range of motion.   Skin:     General: Skin is warm and dry.      Findings: No rash.   Neurological:      General: No focal deficit present.      Mental Status: She is alert and oriented to person, place, and time.   Psychiatric:         Mood and Affect: Mood normal.         Behavior: Behavior normal.       Health Maintenance         Date Due Completion Date    Foot Exam Never done ---    Diabetic Eye Exam Never done ---    Low Dose Statin Never done ---    Pneumococcal Vaccines (Age 50+) (2 of 2 - PCV) 06/01/2017 6/1/2016    Mammogram 06/19/2018 6/19/2017    Shingles Vaccine (1 of 2) Never done ---    Influenza Vaccine (1) 09/01/2024 10/11/2017    Hemoglobin A1c 02/25/2025 11/25/2024    COVID-19 Vaccine (3 - 2024-25 season) 11/22/2025 (Originally 9/1/2024) 12/13/2021    Colorectal Cancer Screening 08/17/2025 2/17/2025    Lipid Panel 11/25/2025 11/25/2024    Diabetes " Urine Screening 01/08/2026 1/8/2025    TETANUS VACCINE 03/27/2027 3/27/2017    RSV Vaccine (Age 60+ and Pregnant patients) (1 - 1-dose 75+ series) 01/30/2049 ---          ASSESSMENT     50 y.o. female with     1. Itching in the vaginal area    2. Type 2 diabetes mellitus with diabetic polyneuropathy, without long-term current use of insulin    3. Primary hypertension    4. Other specified hypothyroidism    5. Encounter for screening mammogram for breast cancer        PLAN:     1. Itching in the vaginal area  Recurrent. Treating.   - fluconazole (DIFLUCAN) 150 MG Tab; Take 1 tab (150 mg) by mouth once daily x 1 day. May take 1 tab by mouth after 72 hrs from first dose.  Dispense: 2 tablet; Refill: 0    2. Type 2 diabetes mellitus with diabetic polyneuropathy, without long-term current use of insulin  Ongoing. Addressing.   - Hemoglobin A1C; Future  - semaglutide (OZEMPIC) 0.25 mg or 0.5 mg (2 mg/3 mL) pen injector; Inject 0.5 mg into the skin every 7 days.  Dispense: 3 mL; Refill: 0  - semaglutide (OZEMPIC) 1 mg/dose (4 mg/3 mL); Inject 1 mg into the skin every 7 days.  Dispense: 3 mL; Refill: 1  - Ambulatory referral/consult to Optometry; Future  - metFORMIN (GLUCOPHAGE) 1000 MG tablet; Take 1 tablet (1,000 mg total) by mouth 2 (two) times daily with meals.  Dispense: 60 tablet; Refill: 2    3. Primary hypertension  Stable. Continue olmesartan.     4. Other specified hypothyroidism  Stable. Continue levothyroxine.   - levothyroxine (SYNTHROID) 125 MCG tablet; Take 1 tablet (125 mcg total) by mouth every morning.  Dispense: 30 tablet; Refill: 2    5. Encounter for screening mammogram for breast cancer  Due. Ordered.   - Patient had mammogram completed at [facility] on 09/23/2024.   - Mammo Digital Screening Bilat; Future      Assessment & Plan    IMPRESSION:  - Assessed diabetes management, noting concerns with glucose control and medication efficacy.  - Evaluated current Ozempic (semaglutide) administration technique,  "identifying potential issues with dosing.  - Considered vaginal itching as potentially related to uncontrolled blood sugar.  - Reviewed thyroid medication management and BP control.  - Assessed need for annual diabetic eye exam and mammogram.    TYPE 2 DIABETES MELLITUS:  - Monitored the patient's blood sugar level, which was 200 in the morning.  - Patient reported consuming a green juice for dinner, expecting it to lower blood sugar.  - Evaluated the patient's diet and its impact on blood sugar.  - Prescribed semaglutide injection, starting with 0.5 mg for 1 month, then increasing to 1 mg starting in May.  - Educated the patient on proper Ozempic (semaglutide) injection technique.  - Ordered a glucose test to be performed during the visit.  - Referred the patient to Dr. Metcalf for diabetes management with follow up in 3 months.  - Provided dietary advice, recommending low-sugar fruits.  - Explained appropriate fruit choices for diabetic diet, emphasizing low-sugar options like berries and discussed that even "healthy" foods can contain high amounts of sugar.  - Patient to limit fruit intake to low-sugar options like berries and avoid high-sugar fruits such as orange, banana, and pineapple.    ESSENTIAL HYPERTENSION:  - Confirmed the patient's blood pressure is well-controlled.  - Continued olmesartan for blood pressure management.    HYPOTHYROIDISM:  - Refilled levothyroxine 0.5 mg daily for 1 month.  - Ensured the patient has sufficient thyroid medication.    PRESBYOPIA:  - Monitored the patient's vision, noting difficulty seeing small letters.  - Assessed the need for annual eye exams.  - Ordered an eye exam.    CHRONIC CANDIDIASIS OF VULVA AND VAGINA:  - Monitored the patient's report of vaginal itching.  - Evaluated the association between vaginal itching and uncontrolled blood sugar.  - Assessed the patient's sexual activity.  - Prescribed Diflucan for vaginal itching.  - Prescribed antifungal medication to " be taken on alternate days.  - Instructed the patient to contact the office if vaginal itching persists.      ADRIENNE Winters  [facility]  04/01/2025 11:16 AM    I spent a total of 30 minutes on the day of the visit.This includes face to face time and non-face to face time preparing to see the patient (eg, review of tests), obtaining and/or reviewing separately obtained history, documenting clinical information in the electronic or other health record, independently interpreting results and communicating results to the patient/family/caregiver, or care coordinator.     Follow up in about 3 months (around 7/1/2025) for Dr. Rickey Metcalf.    This note was generated with the assistance of ambient listening technology. Verbal consent was obtained by the patient and accompanying visitor(s) for the recording of patient appointment to facilitate this note. I attest to having reviewed and edited the generated note for accuracy, though some syntax or spelling errors may persist. Please contact the author of this note for any clarification.         [1]   Current Outpatient Medications   Medication Sig Dispense Refill    blood-glucose meter (TRUE METRIX GLUCOSE METER) kit USE AS DIRECTED      fluconazole (DIFLUCAN) 150 MG Tab Take 1 tab (150 mg) by mouth once daily x 1 day. May take 1 tab by mouth after 72 hrs from first dose. 2 tablet 0    GAVILYTE-C 240-22.72-6.72 -5.84 gram SolR Take by mouth.      olmesartan (BENICAR) 20 MG tablet Take 1 tablet (20 mg total) by mouth once daily. Para la presion 30 tablet 2    fluconazole (DIFLUCAN) 150 MG Tab Take 1 tab (150 mg) by mouth once daily x 1 day. May take 1 tab by mouth after 72 hrs from first dose. 2 tablet 0    levothyroxine (SYNTHROID) 125 MCG tablet Take 1 tablet (125 mcg total) by mouth every morning. 30 tablet 2    metFORMIN (GLUCOPHAGE) 1000 MG tablet Take 1 tablet (1,000 mg total) by mouth 2 (two) times daily with meals. 60 tablet 2    semaglutide  (OZEMPIC) 0.25 mg or 0.5 mg (2 mg/3 mL) pen injector Inject 0.5 mg into the skin every 7 days. 3 mL 0    [START ON 5/2/2025] semaglutide (OZEMPIC) 1 mg/dose (4 mg/3 mL) Inject 1 mg into the skin every 7 days. 3 mL 1     No current facility-administered medications for this visit.

## 2025-04-22 ENCOUNTER — OFFICE VISIT (OUTPATIENT)
Dept: OBSTETRICS AND GYNECOLOGY | Facility: CLINIC | Age: 50
End: 2025-04-22
Payer: MEDICAID

## 2025-04-22 ENCOUNTER — RESULTS FOLLOW-UP (OUTPATIENT)
Dept: PRIMARY CARE CLINIC | Facility: CLINIC | Age: 50
End: 2025-04-22

## 2025-04-22 VITALS
WEIGHT: 180.75 LBS | DIASTOLIC BLOOD PRESSURE: 92 MMHG | SYSTOLIC BLOOD PRESSURE: 148 MMHG | BODY MASS INDEX: 31.03 KG/M2

## 2025-04-22 DIAGNOSIS — Z01.419 WOMEN'S ANNUAL ROUTINE GYNECOLOGICAL EXAMINATION: Primary | ICD-10-CM

## 2025-04-22 DIAGNOSIS — N76.0 ACUTE VAGINITIS: ICD-10-CM

## 2025-04-22 PROCEDURE — 3077F SYST BP >= 140 MM HG: CPT | Mod: CPTII,,, | Performed by: PHYSICIAN ASSISTANT

## 2025-04-22 PROCEDURE — 4010F ACE/ARB THERAPY RXD/TAKEN: CPT | Mod: CPTII,,, | Performed by: PHYSICIAN ASSISTANT

## 2025-04-22 PROCEDURE — 81515 NFCT DS BV&VAGINITIS DNA ALG: CPT | Performed by: PHYSICIAN ASSISTANT

## 2025-04-22 PROCEDURE — 99212 OFFICE O/P EST SF 10 MIN: CPT | Mod: PBBFAC | Performed by: PHYSICIAN ASSISTANT

## 2025-04-22 PROCEDURE — 3008F BODY MASS INDEX DOCD: CPT | Mod: CPTII,,, | Performed by: PHYSICIAN ASSISTANT

## 2025-04-22 PROCEDURE — 99999 PR PBB SHADOW E&M-EST. PATIENT-LVL II: CPT | Mod: PBBFAC,,, | Performed by: PHYSICIAN ASSISTANT

## 2025-04-22 PROCEDURE — 3061F NEG MICROALBUMINURIA REV: CPT | Mod: CPTII,,, | Performed by: PHYSICIAN ASSISTANT

## 2025-04-22 PROCEDURE — 3080F DIAST BP >= 90 MM HG: CPT | Mod: CPTII,,, | Performed by: PHYSICIAN ASSISTANT

## 2025-04-22 PROCEDURE — 1159F MED LIST DOCD IN RCRD: CPT | Mod: CPTII,,, | Performed by: PHYSICIAN ASSISTANT

## 2025-04-22 PROCEDURE — 3046F HEMOGLOBIN A1C LEVEL >9.0%: CPT | Mod: CPTII,,, | Performed by: PHYSICIAN ASSISTANT

## 2025-04-22 PROCEDURE — 99386 PREV VISIT NEW AGE 40-64: CPT | Mod: S$PBB,,, | Performed by: PHYSICIAN ASSISTANT

## 2025-04-22 PROCEDURE — 87591 N.GONORRHOEAE DNA AMP PROB: CPT | Performed by: PHYSICIAN ASSISTANT

## 2025-04-22 PROCEDURE — 3066F NEPHROPATHY DOC TX: CPT | Mod: CPTII,,, | Performed by: PHYSICIAN ASSISTANT

## 2025-04-22 RX ORDER — METRONIDAZOLE 500 MG/1
500 TABLET ORAL 2 TIMES DAILY
Qty: 14 TABLET | Refills: 0 | Status: SHIPPED | OUTPATIENT
Start: 2025-04-22 | End: 2025-04-29

## 2025-04-22 RX ORDER — CLOTRIMAZOLE AND BETAMETHASONE DIPROPIONATE 10; .64 MG/G; MG/G
CREAM TOPICAL
Qty: 15 G | Refills: 1 | Status: SHIPPED | OUTPATIENT
Start: 2025-04-22

## 2025-04-22 NOTE — PROGRESS NOTES
HISTORY OF PRESENT ILLNESS:    Lois Varela is a 50 y.o. female, No obstetric history on file.  presents today for her routine visit. She is s/p hysterectomy. Reports GYN complaints. REPORTS VAGINAL ITCH. States this has been ongoing x 3 months. Has been treated with OTC medication without much improvement and diflucan without much improvement. She has h/o DM uncontrolled. No other concerns or complaints at this visit.       SCREENING:  PAP: HYST  MAMMOGRAM: ORDERED   COLONOSCOPY: DUE 8/2025    DEXA: N/A     COMPREHENSIVE GYN HISTORY:    PAP History: Denies abnormal Paps   Infection History: Denies STDs. Denies PID.  Benign History: Denies uterine fibroids. Denies ovarian cysts. Denies endometriosis. Denies other conditions.  Cancer History: Denies cervical cancer. Denies uterine cancer or hyperplasia. Denies ovarian cancer. Denies vulvar cancer or pre-cancer. Denies vaginal cancer or pre-cancer. Denies breast cancer. Denies colon cancer.  Menstrual History: Denies menses.     : Neelam [id number]  Past Medical History:   Diagnosis Date    Hypertension     Hypothyroidism 2/26/2024    Neuropathy 2/26/2024    Type 2 diabetes mellitus, controlled        Past Surgical History:   Procedure Laterality Date    COLONOSCOPY, SCREENING, LOW RISK PATIENT N/A 2/17/2025    Procedure: COLONOSCOPY, SCREENING, LOW RISK PATIENT;  Surgeon: Zora Hoff MD;  Location: [facility] ENDOSCOPY;  Service: Endoscopy;  Laterality: N/A;    12/3 ref by Sierra Carroll FNP, PEG, mailed-st  1/6/25- r/s, has prep, instr to portal. DB  2/12- confirmed-SB    HYSTERECTOMY      VAGINAL DELIVERY      x3       MEDICATIONS AND ALLERGIES:    Current Medications[1]    Review of patient's allergies indicates:  No Known Allergies    Family History   Problem Relation Name Age of Onset    No Known Problems Mother      No Known Problems Father      No Known Problems Sister      No Known Problems Brother         Social  History[2]    ROS:  GENERAL: Feeling well overall. No weight changes. No swelling. No fatigue. No fever.  CARDIOVASCULAR: No chest pain. No shortness of breath. No leg cramps.   NEUROLOGICAL: No headaches. No vision changes.  BREASTS: No pain. No lumps. No discharge.  ABDOMEN: No pain. No nausea. No vomiting. No diarrhea. No constipation.  REPRODUCTIVE: No abnormal bleeding. See HPI  VULVA: No pain. No lesions. No itching.  VAGINA: No relaxation. + itching. No odor. No discharge. No lesions.  URINARY: No incontinence. No nocturia. No frequency. No dysuria.  PSYCHIATRIC: Denies depression.    BP (!) 148/92   Wt 82 kg (180 lb 12.4 oz)   BMI 31.03 kg/m²     PE:  APPEARANCE: Well nourished, well developed, in no acute distress.  AFFECT: WNL, alert and oriented x 3.  SKIN: No acne or hirsutism.  NECK: Neck symmetric without masses or thyromegaly.  NODES: No inguinal, cervical, axillary or femoral lymph node enlargement.  CHEST: Good respiratory effort.   ABDOMEN: Soft. No tenderness or masses. No hepatosplenomegaly. No hernias.  PELVIC: ATROPHIC EXTERNAL FEMALE GENITALIA without lesions. Normal hair distribution. Adequate perineal body, normal urethral meatus. VAGINA DRY without lesions +thin white discharge. No significant cystocele or rectocele. Bimanual exam shows CERVIX and UTERUS to be SURGICALLY ABSENT. Adnexa without masses or tenderness.  EXTREMITIES: No edema.    DIAGNOSIS:  1. Acute vaginitis  Vaginosis Screen by DNA Probe    C. trachomatis/N. gonorrhoeae by AMP DNA    clotrimazole-betamethasone 1-0.05% (LOTRISONE) cream    metroNIDAZOLE (FLAGYL) 500 MG tablet          PLAN:   - Pap no longer indicated.  hyst  - Screening tests as ordered.  - Diet and exercise encouraged.  Reviewed ASCCP Pap guidelines and screening recommendations.    Counseling: reviewed current Pap guidelines. Explained new understanding of natural history of cervical disease and improved Paps. Recommended guideline concordant  care.  Patient was counseled today on postmenopausal issues.     The patient was counseled today on osteoporosis prevention, calcium supplementation, and regular weight bearing exercise. The patient was also counseled today on ACS PAP guidelines, with recommendations for yearly pelvic exams unless their uterus, cervix, and ovaries were removed for benign reasons; in that case, examinations every 3-5 years are recommended.  The patient was also counseled regarding monthly breast self-examination, routine STD screening for at-risk populations, prophylactic immunizations for transmitted infections such as  HPV, Pertussis, or Influenza as appropriate, and yearly mammograms when indicated by ACS guidelines.  She was advised to see her primary care physician for all other health maintenance.  FOLLOW-UP with me for next routine visit.   Marilyn Wright PA-C         [1]   Current Outpatient Medications:     blood-glucose meter (TRUE METRIX GLUCOSE METER) kit, USE AS DIRECTED, Disp: , Rfl:     clotrimazole-betamethasone 1-0.05% (LOTRISONE) cream, Apply to affected area 2 times daily, Disp: 15 g, Rfl: 1    fluconazole (DIFLUCAN) 150 MG Tab, Take 1 tab (150 mg) by mouth once daily x 1 day. May take 1 tab by mouth after 72 hrs from first dose., Disp: 2 tablet, Rfl: 0    fluconazole (DIFLUCAN) 150 MG Tab, Take 1 tab (150 mg) by mouth once daily x 1 day. May take 1 tab by mouth after 72 hrs from first dose., Disp: 2 tablet, Rfl: 0    GAVILYTE-C 240-22.72-6.72 -5.84 gram SolR, Take by mouth., Disp: , Rfl:     levothyroxine (SYNTHROID) 125 MCG tablet, Take 1 tablet (125 mcg total) by mouth every morning., Disp: 30 tablet, Rfl: 2    metFORMIN (GLUCOPHAGE) 1000 MG tablet, Take 1 tablet (1,000 mg total) by mouth 2 (two) times daily with meals., Disp: 60 tablet, Rfl: 2    metroNIDAZOLE (FLAGYL) 500 MG tablet, Take 1 tablet (500 mg total) by mouth 2 (two) times daily. for 7 days, Disp: 14 tablet, Rfl: 0    olmesartan (BENICAR) 20 MG  tablet, Take 1 tablet (20 mg total) by mouth once daily. Para la presion, Disp: 30 tablet, Rfl: 2    semaglutide (OZEMPIC) 0.25 mg or 0.5 mg (2 mg/3 mL) pen injector, Inject 0.5 mg into the skin every 7 days., Disp: 3 mL, Rfl: 0    [START ON 5/2/2025] semaglutide (OZEMPIC) 1 mg/dose (4 mg/3 mL), Inject 1 mg into the skin every 7 days., Disp: 3 mL, Rfl: 1  [2]   Social History  Socioeconomic History    Marital status: Single   Tobacco Use    Smoking status: Never   Substance and Sexual Activity    Alcohol use: No    Drug use: No    Sexual activity: Yes     Partners: Male     Social Drivers of Health     Financial Resource Strain: Low Risk  (10/27/2024)    Received from [facility]    Overall Financial Resource Strain (CARDIA)     Difficulty of Paying Living Expenses: Not very hard   Food Insecurity: No Food Insecurity (10/27/2024)    Received from [facility]    Hunger Vital Sign     Worried About Running Out of Food in the Last Year: Never true     Ran Out of Food in the Last Year: Never true   Transportation Needs: No Transportation Needs (10/27/2024)    Received from [facility]    PRAPARE - Transportation     Lack of Transportation (Medical): No     Lack of Transportation (Non-Medical): No   Physical Activity: Insufficiently Active (10/27/2024)    Received from [facility]    Exercise Vital Sign     Days of Exercise per Week: 1 day     Minutes of Exercise per Session: 30 min   Stress: No Stress Concern Present (10/27/2024)    Received from [facility]    [employer] [place] [employer] - Occupational Stress Questionnaire     Feeling of Stress : Not at all   Housing Stability: Unknown (10/27/2024)    Received from [facility]    Housing Stability Vital Sign     Unable to Pay for Housing in the Last Year: No

## 2025-04-24 LAB
C TRACH DNA SPEC QL NAA+PROBE: NOT DETECTED
CTGC SOURCE (OHS) ORD-325: NORMAL
N GONORRHOEA DNA UR QL NAA+PROBE: NOT DETECTED

## 2025-04-25 LAB
BACTERIAL VAGINOSIS DNA (OHS): NOT DETECTED
CANDIDA GLABRATA/KRUSEI DNA (OHS): DETECTED
CANDIDA SPECIES DNA (OHS): NOT DETECTED
TRICHOMONAS VAGINALIS DNA (OHS): NOT DETECTED

## 2025-04-28 ENCOUNTER — RESULTS FOLLOW-UP (OUTPATIENT)
Dept: OBSTETRICS AND GYNECOLOGY | Facility: CLINIC | Age: 50
End: 2025-04-28

## 2025-05-29 DIAGNOSIS — I10 PRIMARY HYPERTENSION: ICD-10-CM

## 2025-05-30 RX ORDER — OLMESARTAN MEDOXOMIL 20 MG/1
20 TABLET ORAL DAILY
Qty: 30 TABLET | Refills: 2 | Status: SHIPPED | OUTPATIENT
Start: 2025-05-30 | End: 2025-08-28

## 2025-08-07 ENCOUNTER — TELEPHONE (OUTPATIENT)
Dept: PRIMARY CARE CLINIC | Facility: CLINIC | Age: 50
End: 2025-08-07
Payer: MEDICAID

## (undated) DEVICE — ELECTRODE REM PLYHSV RETURN 9

## (undated) DEVICE — NDL HYPO REG 25G X 1 1/2

## (undated) DEVICE — TRAY FOLEY 16FR INFECTION CONT

## (undated) DEVICE — DRESSING LEUKOPLAST FLEX 1X3IN

## (undated) DEVICE — SEE MEDLINE ITEM 157131

## (undated) DEVICE — BANDAGE ADHESIVE

## (undated) DEVICE — CATH URETHRAL 16FR RED

## (undated) DEVICE — BAG TISS RETRV MONARCH 10MM

## (undated) DEVICE — COVER OVERHEAD SURG LT BLUE

## (undated) DEVICE — PACK LAPAROSCOPY TIBURON

## (undated) DEVICE — ADHESIVE DERMABOND ADVANCED

## (undated) DEVICE — TROCAR ENDOPATH XCEL 11MM 10CM

## (undated) DEVICE — TROCAR ENDOPATH XCEL 5MM 7.5CM

## (undated) DEVICE — SYR B-D DISP CONTROL 10CC100/C

## (undated) DEVICE — SYR 10CC LUER LOCK

## (undated) DEVICE — TROCAR ENDOPATH XCEL 5X100MM

## (undated) DEVICE — IRRIGATOR ENDOSCOPY DISP.

## (undated) DEVICE — PAD PREP 50/CA

## (undated) DEVICE — APPLICATOR ARISTA FLEX XL

## (undated) DEVICE — POWDER ARISTA AH 3G

## (undated) DEVICE — BLADE SURG CARBON STEEL SZ11

## (undated) DEVICE — ADHESIVE SURG LIQ 2 OZ

## (undated) DEVICE — KIT ANTIFOG

## (undated) DEVICE — SCISSOR 5MMX35CM DIRECT DRIVE

## (undated) DEVICE — SEE MEDLINE ITEM 157117

## (undated) DEVICE — SEE MEDLINE ITEM 154981

## (undated) DEVICE — SEE MEDLINE ITEM 157116

## (undated) DEVICE — TROCAR ENDOPATH XCEL 5X75MM

## (undated) DEVICE — MANIFOLD 4 PORT

## (undated) DEVICE — SUT 2/0 36IN COATED VICRYL

## (undated) DEVICE — SEE MEDLINE ITEM 156955

## (undated) DEVICE — TUBING INSUFFLATION 10

## (undated) DEVICE — SEE MEDLINE ITEM 146355

## (undated) DEVICE — APPLICATOR CHLORAPREP ORN 26ML

## (undated) DEVICE — SEE MEDLINE ITEM 146292

## (undated) DEVICE — GLOVE BIOGEL ECLIPSE SZ 7.5

## (undated) DEVICE — UNDERGLOVES BIOGEL PI SIZE 8

## (undated) DEVICE — Device

## (undated) DEVICE — SEALER LIGASURE LAP 37CM 5MM

## (undated) DEVICE — SEE MEDLINE ITEM 152622